# Patient Record
Sex: FEMALE | Race: WHITE | Employment: FULL TIME | URBAN - METROPOLITAN AREA
[De-identification: names, ages, dates, MRNs, and addresses within clinical notes are randomized per-mention and may not be internally consistent; named-entity substitution may affect disease eponyms.]

---

## 2017-05-17 ENCOUNTER — OFFICE VISIT (OUTPATIENT)
Dept: FAMILY MEDICINE CLINIC | Age: 47
End: 2017-05-17

## 2017-05-17 VITALS
RESPIRATION RATE: 16 BRPM | SYSTOLIC BLOOD PRESSURE: 134 MMHG | WEIGHT: 181.4 LBS | BODY MASS INDEX: 32.14 KG/M2 | TEMPERATURE: 96.7 F | HEART RATE: 72 BPM | HEIGHT: 63 IN | DIASTOLIC BLOOD PRESSURE: 88 MMHG

## 2017-05-17 DIAGNOSIS — J01.90 ACUTE SINUSITIS, UNSPECIFIED: ICD-10-CM

## 2017-05-17 DIAGNOSIS — J06.9 ACUTE UPPER RESPIRATORY INFECTION: Primary | ICD-10-CM

## 2017-05-17 DIAGNOSIS — J30.1 SEASONAL ALLERGIC RHINITIS DUE TO POLLEN: ICD-10-CM

## 2017-05-17 DIAGNOSIS — I10 ESSENTIAL HYPERTENSION: ICD-10-CM

## 2017-05-17 PROCEDURE — G8419 CALC BMI OUT NRM PARAM NOF/U: HCPCS | Performed by: FAMILY MEDICINE

## 2017-05-17 PROCEDURE — 99213 OFFICE O/P EST LOW 20 MIN: CPT | Performed by: FAMILY MEDICINE

## 2017-05-17 PROCEDURE — 4004F PT TOBACCO SCREEN RCVD TLK: CPT | Performed by: FAMILY MEDICINE

## 2017-05-17 PROCEDURE — G8427 DOCREV CUR MEDS BY ELIG CLIN: HCPCS | Performed by: FAMILY MEDICINE

## 2017-05-17 RX ORDER — IRBESARTAN 150 MG/1
TABLET ORAL
Qty: 30 TABLET | Refills: 5 | Status: SHIPPED | OUTPATIENT
Start: 2017-05-17 | End: 2018-01-04 | Stop reason: SDUPTHER

## 2017-05-17 RX ORDER — FEXOFENADINE HCL 180 MG/1
180 TABLET ORAL DAILY
Qty: 30 TABLET | Refills: 1 | Status: SHIPPED | OUTPATIENT
Start: 2017-05-17

## 2017-05-17 RX ORDER — BUTALBITAL, ACETAMINOPHEN AND CAFFEINE 50; 325; 40 MG/1; MG/1; MG/1
CAPSULE ORAL
Qty: 40 CAPSULE | Refills: 1 | Status: SHIPPED | OUTPATIENT
Start: 2017-05-17 | End: 2017-10-03 | Stop reason: SDUPTHER

## 2017-05-17 RX ORDER — AZITHROMYCIN 250 MG/1
TABLET, FILM COATED ORAL
Qty: 6 TABLET | Refills: 0 | Status: SHIPPED | OUTPATIENT
Start: 2017-05-17 | End: 2017-07-05 | Stop reason: ALTCHOICE

## 2017-07-05 ENCOUNTER — OFFICE VISIT (OUTPATIENT)
Dept: FAMILY MEDICINE CLINIC | Age: 47
End: 2017-07-05

## 2017-07-05 VITALS
WEIGHT: 183.6 LBS | BODY MASS INDEX: 32.53 KG/M2 | TEMPERATURE: 95.9 F | RESPIRATION RATE: 16 BRPM | SYSTOLIC BLOOD PRESSURE: 160 MMHG | DIASTOLIC BLOOD PRESSURE: 98 MMHG | HEART RATE: 92 BPM | HEIGHT: 63 IN

## 2017-07-05 DIAGNOSIS — M62.830 LUMBAR PARASPINAL MUSCLE SPASM: ICD-10-CM

## 2017-07-05 DIAGNOSIS — S39.012A LUMBAR STRAIN, INITIAL ENCOUNTER: Primary | ICD-10-CM

## 2017-07-05 PROCEDURE — 99213 OFFICE O/P EST LOW 20 MIN: CPT | Performed by: FAMILY MEDICINE

## 2017-07-05 PROCEDURE — 4004F PT TOBACCO SCREEN RCVD TLK: CPT | Performed by: FAMILY MEDICINE

## 2017-07-05 PROCEDURE — G8427 DOCREV CUR MEDS BY ELIG CLIN: HCPCS | Performed by: FAMILY MEDICINE

## 2017-07-05 PROCEDURE — G8419 CALC BMI OUT NRM PARAM NOF/U: HCPCS | Performed by: FAMILY MEDICINE

## 2017-07-05 RX ORDER — TIZANIDINE 4 MG/1
4 TABLET ORAL EVERY 8 HOURS PRN
Qty: 30 TABLET | Refills: 0 | Status: SHIPPED | OUTPATIENT
Start: 2017-07-05 | End: 2017-10-03 | Stop reason: ALTCHOICE

## 2017-07-05 RX ORDER — METHYLPREDNISOLONE 4 MG/1
TABLET ORAL
Qty: 1 KIT | Refills: 0 | Status: SHIPPED | OUTPATIENT
Start: 2017-07-05 | End: 2017-07-11

## 2017-07-05 RX ORDER — OXYCODONE HYDROCHLORIDE AND ACETAMINOPHEN 5; 325 MG/1; MG/1
1 TABLET ORAL EVERY 6 HOURS PRN
Qty: 12 TABLET | Refills: 0 | Status: SHIPPED | OUTPATIENT
Start: 2017-07-05 | End: 2017-07-12

## 2017-07-05 ASSESSMENT — ENCOUNTER SYMPTOMS
COUGH: 0
ABDOMINAL PAIN: 0
EYES NEGATIVE: 1
DIARRHEA: 0
SHORTNESS OF BREATH: 0
CONSTIPATION: 0
NAUSEA: 0

## 2017-10-03 ENCOUNTER — OFFICE VISIT (OUTPATIENT)
Dept: FAMILY MEDICINE CLINIC | Age: 47
End: 2017-10-03

## 2017-10-03 VITALS
BODY MASS INDEX: 32.25 KG/M2 | HEIGHT: 63 IN | HEART RATE: 68 BPM | TEMPERATURE: 96.8 F | RESPIRATION RATE: 16 BRPM | DIASTOLIC BLOOD PRESSURE: 84 MMHG | SYSTOLIC BLOOD PRESSURE: 124 MMHG | WEIGHT: 182 LBS

## 2017-10-03 DIAGNOSIS — J01.90 ACUTE SINUSITIS, UNSPECIFIED: ICD-10-CM

## 2017-10-03 DIAGNOSIS — F17.200 CURRENT SMOKER: ICD-10-CM

## 2017-10-03 DIAGNOSIS — S66.911A WRIST STRAIN, RIGHT, INITIAL ENCOUNTER: ICD-10-CM

## 2017-10-03 DIAGNOSIS — J06.9 ACUTE UPPER RESPIRATORY INFECTION: Primary | ICD-10-CM

## 2017-10-03 DIAGNOSIS — I10 ESSENTIAL HYPERTENSION: ICD-10-CM

## 2017-10-03 PROCEDURE — 4004F PT TOBACCO SCREEN RCVD TLK: CPT | Performed by: FAMILY MEDICINE

## 2017-10-03 PROCEDURE — 99213 OFFICE O/P EST LOW 20 MIN: CPT | Performed by: FAMILY MEDICINE

## 2017-10-03 PROCEDURE — G8484 FLU IMMUNIZE NO ADMIN: HCPCS | Performed by: FAMILY MEDICINE

## 2017-10-03 PROCEDURE — G8419 CALC BMI OUT NRM PARAM NOF/U: HCPCS | Performed by: FAMILY MEDICINE

## 2017-10-03 PROCEDURE — G8427 DOCREV CUR MEDS BY ELIG CLIN: HCPCS | Performed by: FAMILY MEDICINE

## 2017-10-03 RX ORDER — AZITHROMYCIN 250 MG/1
TABLET, FILM COATED ORAL
Qty: 6 TABLET | Refills: 0 | Status: SHIPPED | OUTPATIENT
Start: 2017-10-03 | End: 2018-08-22 | Stop reason: ALTCHOICE

## 2017-10-03 RX ORDER — METHYLPREDNISOLONE 4 MG/1
TABLET ORAL
Qty: 1 KIT | Refills: 0 | Status: SHIPPED | OUTPATIENT
Start: 2017-10-03 | End: 2018-08-22 | Stop reason: ALTCHOICE

## 2017-10-03 RX ORDER — BUTALBITAL, ACETAMINOPHEN AND CAFFEINE 50; 325; 40 MG/1; MG/1; MG/1
CAPSULE ORAL
Qty: 40 CAPSULE | Refills: 3 | Status: SHIPPED | OUTPATIENT
Start: 2017-10-03 | End: 2018-08-22 | Stop reason: SDUPTHER

## 2017-10-03 ASSESSMENT — ENCOUNTER SYMPTOMS
SORE THROAT: 1
COUGH: 1
SHORTNESS OF BREATH: 0
EYES NEGATIVE: 1
SINUS PRESSURE: 1
CONSTIPATION: 0
DIARRHEA: 0
NAUSEA: 0
ABDOMINAL PAIN: 0

## 2017-10-03 NOTE — PROGRESS NOTES
Subjective  Mary Rider, 50 y.o. female presents today with:  Chief Complaint   Patient presents with    URI     Patient presents today complaining of sinus pressure, sore throat, chest congestion, and cough x 3 days. Has not tried anything for it.  Wrist Pain                HPI    Patient presents today Complaining of upper respiratory symptoms including sinus congestion sore throat with minimal dry cough. Symptoms coming on for 3 days. Has not really tried anything for it    Taking current medications which were reviewed. Problem list discussed. Eating okay. Continues to work full-time    Overall doing well. Has 1 new problem / question. Also complaining of right wrist pain x 1 week. Thinks that she either hurt it while golfing, or while lifting up a jug of spring water. There is swelling, and severe pain. Has tried OTC Tylenol and Ice with mild relief. States there is a constant throbbing. Health Maintenance Is Up-To-Date          No other questions and or concerns for today's visit      Review of Systems   Constitutional: Negative for activity change, appetite change, fatigue and fever. HENT: Positive for congestion, sinus pressure and sore throat. Negative for ear pain. Eyes: Negative. Respiratory: Positive for cough. Negative for shortness of breath. Cardiovascular: Negative for chest pain and palpitations. Gastrointestinal: Negative for abdominal pain, constipation, diarrhea and nausea. Genitourinary: Negative for dysuria and frequency. Neurological: Negative for dizziness and headaches.          Past Medical History:   Diagnosis Date    Anxiety     Cuboid fracture     Depression     Headache 10/3/2013     Past Surgical History:   Procedure Laterality Date    CYST REMOVAL      cervix    FOOT SURGERY      right heel spur     Social History     Social History    Marital status:      Spouse name: Armani Jackman     Number of children: 1    Years of education: N/A     Occupational History   [employer]      Social History Main Topics    Smoking status: Current Every Day Smoker     Packs/day: 1.00     Years: 20.00     Types: Cigarettes     Start date: 1/1/1996    Smokeless tobacco: Never Used    Alcohol use No    Drug use: No    Sexual activity: Not on file     Other Topics Concern    Not on file     Social History Narrative     History reviewed. No pertinent family history. Allergies   Allergen Reactions    Motrin [Ibuprofen] Diarrhea    Sulfa Antibiotics     Cefuroxime Axetil Rash    Ciprofloxacin Nausea And Vomiting and Rash     Current Outpatient Prescriptions   Medication Sig Dispense Refill    butalbital-apap-caffeine (ESGIC) -40 MG CAPS 1-2 tablets by mouth every 4 hours prn 40 capsule 3    azithromycin (ZITHROMAX Z-JACE) 250 MG tablet 2 STAT THEN 1 DAILY 6 tablet 0    methylPREDNISolone (MEDROL DOSEPACK) 4 MG tablet Take by mouth. 1 kit 0    irbesartan (AVAPRO) 150 MG tablet take 1 tablet by mouth once daily 30 tablet 5    fexofenadine (ALLEGRA) 180 MG tablet Take 1 tablet by mouth daily 30 tablet 1     No current facility-administered medications for this visit. PMH, Surgical Hx, Family Hx, and Social Hx reviewed and updated. Health Maintenance reviewed. Objective    Vitals:    10/03/17 1135   BP: 124/84   Pulse: 68   Resp: 16   Temp: 96.8 °F (36 °C)   TempSrc: Temporal   Weight: 182 lb (82.6 kg)   Height: 5' 2.5\" (1.588 m)       Physical Exam   Constitutional: She appears well-developed. No distress. HENT:   Head: Normocephalic. Right Ear: Hearing and tympanic membrane normal. No swelling. Left Ear: Hearing and tympanic membrane normal.   Nose: Rhinorrhea present. Right sinus exhibits maxillary sinus tenderness. Left sinus exhibits maxillary sinus tenderness. Mouth/Throat: Posterior oropharyngeal erythema present. Eyes: EOM are normal. Pupils are equal, round, and reactive to light.    Neck: Normal range of motion. Neck supple. No thyromegaly present. Cardiovascular: Normal rate, regular rhythm and normal heart sounds. No murmur heard. Pulmonary/Chest: Effort normal and breath sounds normal. She has no wheezes. Abdominal: Soft. Bowel sounds are normal. There is no tenderness. Musculoskeletal: She exhibits no edema. Mild ulnar side wrist pain with range of motion. No swelling. Lymphadenopathy:     She has no cervical adenopathy. Neurological: She is alert. Skin: No rash noted. Assessment & Plan   1. Acute upper respiratory infection     2. Acute sinusitis, unspecified  butalbital-apap-caffeine (ESGIC) -40 MG CAPS    azithromycin (ZITHROMAX Z-JACE) 250 MG tablet   3. Wrist strain, right, initial encounter  methylPREDNISolone (MEDROL DOSEPACK) 4 MG tablet   4. Essential hypertension Stable     5. Current smoker Declines treatment       No orders of the defined types were placed in this encounter. Orders Placed This Encounter   Medications    butalbital-apap-caffeine (ESGIC) -40 MG CAPS     Si-2 tablets by mouth every 4 hours prn     Dispense:  40 capsule     Refill:  3    azithromycin (ZITHROMAX Z-JACE) 250 MG tablet     Si STAT THEN 1 DAILY     Dispense:  6 tablet     Refill:  0    methylPREDNISolone (MEDROL DOSEPACK) 4 MG tablet     Sig: Take by mouth. Dispense:  1 kit     Refill:  0     Medications Discontinued During This Encounter   Medication Reason    tiZANidine (ZANAFLEX) 4 MG tablet Therapy completed    butalbital-apap-caffeine (ESGIC) -40 MG CAPS Reorder     No Follow-up on file. Long talk. Avoid activities that exacerbate symptoms  Take medications as prescribed. Over-the-counter cold medication when necessary  Follow up as instructed.   Call if any problems    Controlled Substances Monitoring:          Sarita Cruz MD          Please note this report has been partially produced using speech recognition software  And may cause contain errors related to that system including grammar, punctuation and spelling as well as words and phrases that may seem inappropriate. If there are questions or concerns please feel free to contact me to clarify.

## 2018-01-04 DIAGNOSIS — I10 ESSENTIAL HYPERTENSION: ICD-10-CM

## 2018-01-05 RX ORDER — IRBESARTAN 150 MG/1
TABLET ORAL
Qty: 30 TABLET | Refills: 5 | Status: SHIPPED | OUTPATIENT
Start: 2018-01-05 | End: 2018-08-08 | Stop reason: SDUPTHER

## 2018-08-08 DIAGNOSIS — I10 ESSENTIAL HYPERTENSION: ICD-10-CM

## 2018-08-10 RX ORDER — IRBESARTAN 150 MG/1
TABLET ORAL
Qty: 30 TABLET | Refills: 5 | Status: SHIPPED | OUTPATIENT
Start: 2018-08-10 | End: 2019-03-03 | Stop reason: SDUPTHER

## 2018-08-22 ENCOUNTER — OFFICE VISIT (OUTPATIENT)
Dept: FAMILY MEDICINE CLINIC | Age: 48
End: 2018-08-22
Payer: COMMERCIAL

## 2018-08-22 VITALS
SYSTOLIC BLOOD PRESSURE: 134 MMHG | DIASTOLIC BLOOD PRESSURE: 86 MMHG | TEMPERATURE: 97.7 F | BODY MASS INDEX: 32.25 KG/M2 | HEIGHT: 63 IN | WEIGHT: 182 LBS | HEART RATE: 84 BPM | RESPIRATION RATE: 16 BRPM

## 2018-08-22 DIAGNOSIS — S39.012A STRAIN OF LUMBAR REGION, INITIAL ENCOUNTER: ICD-10-CM

## 2018-08-22 DIAGNOSIS — Z00.00 ROUTINE GENERAL MEDICAL EXAMINATION AT A HEALTH CARE FACILITY: Primary | ICD-10-CM

## 2018-08-22 DIAGNOSIS — I10 ESSENTIAL HYPERTENSION: ICD-10-CM

## 2018-08-22 DIAGNOSIS — Z00.00 ROUTINE GENERAL MEDICAL EXAMINATION AT A HEALTH CARE FACILITY: ICD-10-CM

## 2018-08-22 DIAGNOSIS — E78.00 HYPERCHOLESTEREMIA: ICD-10-CM

## 2018-08-22 DIAGNOSIS — J43.9 PULMONARY EMPHYSEMA, UNSPECIFIED EMPHYSEMA TYPE (HCC): ICD-10-CM

## 2018-08-22 LAB
ALBUMIN SERPL-MCNC: 4.5 G/DL (ref 3.9–4.9)
ALP BLD-CCNC: 68 U/L (ref 40–130)
ALT SERPL-CCNC: 42 U/L (ref 0–33)
ANION GAP SERPL CALCULATED.3IONS-SCNC: 17 MEQ/L (ref 7–13)
AST SERPL-CCNC: 26 U/L (ref 0–35)
BASOPHILS ABSOLUTE: 0.1 K/UL (ref 0–0.2)
BASOPHILS RELATIVE PERCENT: 1 %
BILIRUB SERPL-MCNC: <0.2 MG/DL (ref 0–1.2)
BUN BLDV-MCNC: 15 MG/DL (ref 6–20)
CALCIUM SERPL-MCNC: 9.5 MG/DL (ref 8.6–10.2)
CHLORIDE BLD-SCNC: 99 MEQ/L (ref 98–107)
CHOLESTEROL, TOTAL: 341 MG/DL (ref 0–199)
CO2: 25 MEQ/L (ref 22–29)
CREAT SERPL-MCNC: 0.45 MG/DL (ref 0.5–0.9)
EOSINOPHILS ABSOLUTE: 0.1 K/UL (ref 0–0.7)
EOSINOPHILS RELATIVE PERCENT: 1.9 %
GFR AFRICAN AMERICAN: >60
GFR NON-AFRICAN AMERICAN: >60
GLOBULIN: 2.6 G/DL (ref 2.3–3.5)
GLUCOSE BLD-MCNC: 98 MG/DL (ref 74–109)
HCT VFR BLD CALC: 46.9 % (ref 37–47)
HDLC SERPL-MCNC: 40 MG/DL (ref 40–59)
HEMOGLOBIN: 15.9 G/DL (ref 12–16)
LDL CHOLESTEROL CALCULATED: ABNORMAL MG/DL (ref 0–129)
LYMPHOCYTES ABSOLUTE: 2.7 K/UL (ref 1–4.8)
LYMPHOCYTES RELATIVE PERCENT: 34.6 %
MCH RBC QN AUTO: 32.1 PG (ref 27–31.3)
MCHC RBC AUTO-ENTMCNC: 33.8 % (ref 33–37)
MCV RBC AUTO: 95 FL (ref 82–100)
MONOCYTES ABSOLUTE: 0.6 K/UL (ref 0.2–0.8)
MONOCYTES RELATIVE PERCENT: 7.5 %
NEUTROPHILS ABSOLUTE: 4.4 K/UL (ref 1.4–6.5)
NEUTROPHILS RELATIVE PERCENT: 55 %
PDW BLD-RTO: 13.8 % (ref 11.5–14.5)
PLATELET # BLD: 238 K/UL (ref 130–400)
POTASSIUM SERPL-SCNC: 4.8 MEQ/L (ref 3.5–5.1)
RBC # BLD: 4.94 M/UL (ref 4.2–5.4)
SODIUM BLD-SCNC: 141 MEQ/L (ref 132–144)
TOTAL PROTEIN: 7.1 G/DL (ref 6.4–8.1)
TRIGL SERPL-MCNC: 863 MG/DL (ref 0–200)
WBC # BLD: 7.9 K/UL (ref 4.8–10.8)

## 2018-08-22 PROCEDURE — 99396 PREV VISIT EST AGE 40-64: CPT | Performed by: FAMILY MEDICINE

## 2018-08-22 RX ORDER — METHYLPREDNISOLONE 4 MG/1
TABLET ORAL
Qty: 1 KIT | Refills: 0 | Status: SHIPPED | OUTPATIENT
Start: 2018-08-22 | End: 2019-02-08 | Stop reason: ALTCHOICE

## 2018-08-22 RX ORDER — BUTALBITAL, ACETAMINOPHEN AND CAFFEINE 50; 325; 40 MG/1; MG/1; MG/1
CAPSULE ORAL
Qty: 40 CAPSULE | Refills: 5 | Status: SHIPPED | OUTPATIENT
Start: 2018-08-22 | End: 2019-02-08 | Stop reason: SDUPTHER

## 2018-08-22 ASSESSMENT — PATIENT HEALTH QUESTIONNAIRE - PHQ9
SUM OF ALL RESPONSES TO PHQ QUESTIONS 1-9: 0
1. LITTLE INTEREST OR PLEASURE IN DOING THINGS: 0
SUM OF ALL RESPONSES TO PHQ QUESTIONS 1-9: 0
2. FEELING DOWN, DEPRESSED OR HOPELESS: 0
SUM OF ALL RESPONSES TO PHQ9 QUESTIONS 1 & 2: 0

## 2018-08-22 ASSESSMENT — ENCOUNTER SYMPTOMS
CONSTIPATION: 0
ABDOMINAL PAIN: 0
DIARRHEA: 0
COUGH: 0
NAUSEA: 0
SHORTNESS OF BREATH: 0
EYES NEGATIVE: 1

## 2018-08-22 NOTE — PATIENT INSTRUCTIONS
trying to quit smoking. · Consider signing up for a smoking cessation program, such as the American Lung Association's Freedom from Smoking program.  · Get text messaging support. Go to the website at www.smokefree. gov to sign up for the [facility] program.  · Set a quit date. Pick your date carefully so that it is not right in the middle of a big deadline or stressful time. Once you quit, do not even take a puff. Get rid of all ashtrays and lighters after your last cigarette. Clean your house and your clothes so that they do not smell of smoke. · Learn how to be a nonsmoker. Think about ways you can avoid those things that make you reach for a cigarette. ¨ Avoid situations that put you at greatest risk for smoking. For some people, it is hard to have a drink with friends without smoking. For others, they might skip a coffee break with coworkers who smoke. ¨ Change your daily routine. Take a different route to work or eat a meal in a different place. · Cut down on stress. Calm yourself or release tension by doing an activity you enjoy, such as reading a book, taking a hot bath, or gardening. · Talk to your doctor or pharmacist about nicotine replacement therapy, which replaces the nicotine in your body. You still get nicotine but you do not use tobacco. Nicotine replacement products help you slowly reduce the amount of nicotine you need. These products come in several forms, many of them available over-the-counter:  ¨ Nicotine patches  ¨ Nicotine gum and lozenges  ¨ Nicotine inhaler  · Ask your doctor about bupropion (Wellbutrin) or varenicline (Chantix), which are prescription medicines. They do not contain nicotine. They help you by reducing withdrawal symptoms, such as stress and anxiety. · Some people find hypnosis, acupuncture, and massage helpful for ending the smoking habit. · Eat a healthy diet and get regular exercise.  Having healthy habits will help your body move past its craving for

## 2018-08-22 NOTE — PROGRESS NOTES
Subjective  Jonathan Mathews, 46 y.o. female presents today with:  Chief Complaint   Patient presents with    Back Pain     Patient presents today complaining of back pain x 8 days. Denies injury, or swelling. Has tried OTC Ibuprofen, but this is messing with her stomach.  Menopause     Has started going through Menopause. Is getting a menstrual period once every 3 months. HPI    Patient presents today for wellness visit  Taking current medications which were reviewed. Problem list discussed. Eating okay. Stays active. Works full-time. Overall doing well. Has 2 new problem / question. Hurt her back twisting. Similar to what she had in the past.  Localizes to the left lower back area. Also complaining of irregular periods. She is going through menopause. Knows he does get hot flashes. Sees gynecology    Health Maintenance Is Up-To-Date         No other questions and or concerns for today's visit      Review of Systems   Constitutional: Negative for activity change, appetite change, fatigue and fever. HENT: Negative for ear pain. Eyes: Negative. Respiratory: Negative for cough and shortness of breath. Cardiovascular: Negative for chest pain and palpitations. Gastrointestinal: Negative for abdominal pain, constipation, diarrhea and nausea. Genitourinary: Negative for dysuria and frequency. Neurological: Negative for dizziness and headaches.          Past Medical History:   Diagnosis Date    Anxiety     Cuboid fracture     Depression     Headache(784.0) 10/3/2013     Past Surgical History:   Procedure Laterality Date    CYST REMOVAL      cervix    FOOT SURGERY      right heel spur     Social History     Social History    Marital status:      Spouse name: Abhijeet Benitez     Number of children: 1    Years of education: N/A     Occupational History   [employer]      Social History Main Topics    Smoking status: Current Every Day Smoker

## 2018-09-04 ENCOUNTER — TELEPHONE (OUTPATIENT)
Dept: FAMILY MEDICINE CLINIC | Age: 48
End: 2018-09-04

## 2018-09-04 DIAGNOSIS — E78.5 HYPERLIPIDEMIA, UNSPECIFIED HYPERLIPIDEMIA TYPE: Primary | ICD-10-CM

## 2018-09-04 RX ORDER — ATORVASTATIN CALCIUM 10 MG/1
10 TABLET, FILM COATED ORAL DAILY
Qty: 30 TABLET | Refills: 3 | Status: SHIPPED | OUTPATIENT
Start: 2018-09-04 | End: 2019-01-07 | Stop reason: SDUPTHER

## 2018-09-04 NOTE — TELEPHONE ENCOUNTER
Pt aware of following message:    Labs WNL or stable except her cholesterol is very high.  Much higher than last year. Would recommend low-dose statin.  If she agrees please let me know    Notify pt. Pt aware.  Would like this sent to [facility]  Please create med with dosage and directions  Thanks

## 2018-10-12 DIAGNOSIS — E78.5 HYPERLIPIDEMIA, UNSPECIFIED HYPERLIPIDEMIA TYPE: ICD-10-CM

## 2018-10-12 LAB
ALT SERPL-CCNC: 44 U/L (ref 0–33)
CHOLESTEROL, TOTAL: 286 MG/DL (ref 0–199)
HDLC SERPL-MCNC: 34 MG/DL (ref 40–59)
LDL CHOLESTEROL CALCULATED: ABNORMAL MG/DL (ref 0–129)
TRIGL SERPL-MCNC: 1289 MG/DL (ref 0–200)

## 2019-01-07 RX ORDER — ATORVASTATIN CALCIUM 10 MG/1
TABLET, FILM COATED ORAL
Qty: 30 TABLET | Refills: 3 | Status: SHIPPED | OUTPATIENT
Start: 2019-01-07

## 2019-02-08 ENCOUNTER — OFFICE VISIT (OUTPATIENT)
Dept: FAMILY MEDICINE CLINIC | Age: 49
End: 2019-02-08
Payer: COMMERCIAL

## 2019-02-08 VITALS
TEMPERATURE: 96.7 F | HEART RATE: 84 BPM | SYSTOLIC BLOOD PRESSURE: 154 MMHG | WEIGHT: 184.6 LBS | HEIGHT: 63 IN | DIASTOLIC BLOOD PRESSURE: 106 MMHG | BODY MASS INDEX: 32.71 KG/M2 | RESPIRATION RATE: 16 BRPM

## 2019-02-08 DIAGNOSIS — J02.9 SORE THROAT: ICD-10-CM

## 2019-02-08 DIAGNOSIS — I10 ESSENTIAL HYPERTENSION: Primary | ICD-10-CM

## 2019-02-08 DIAGNOSIS — J43.9 PULMONARY EMPHYSEMA, UNSPECIFIED EMPHYSEMA TYPE (HCC): ICD-10-CM

## 2019-02-08 PROCEDURE — G8427 DOCREV CUR MEDS BY ELIG CLIN: HCPCS | Performed by: FAMILY MEDICINE

## 2019-02-08 PROCEDURE — G8484 FLU IMMUNIZE NO ADMIN: HCPCS | Performed by: FAMILY MEDICINE

## 2019-02-08 PROCEDURE — G8417 CALC BMI ABV UP PARAM F/U: HCPCS | Performed by: FAMILY MEDICINE

## 2019-02-08 PROCEDURE — G8926 SPIRO NO PERF OR DOC: HCPCS | Performed by: FAMILY MEDICINE

## 2019-02-08 PROCEDURE — 3023F SPIROM DOC REV: CPT | Performed by: FAMILY MEDICINE

## 2019-02-08 PROCEDURE — 4004F PT TOBACCO SCREEN RCVD TLK: CPT | Performed by: FAMILY MEDICINE

## 2019-02-08 PROCEDURE — 3017F COLORECTAL CA SCREEN DOC REV: CPT | Performed by: FAMILY MEDICINE

## 2019-02-08 PROCEDURE — 99213 OFFICE O/P EST LOW 20 MIN: CPT | Performed by: FAMILY MEDICINE

## 2019-02-08 RX ORDER — BUTALBITAL, ACETAMINOPHEN AND CAFFEINE 50; 325; 40 MG/1; MG/1; MG/1
CAPSULE ORAL
Qty: 40 CAPSULE | Refills: 2 | Status: SHIPPED | OUTPATIENT
Start: 2019-02-08

## 2019-02-08 RX ORDER — HYDROCHLOROTHIAZIDE 12.5 MG/1
12.5 CAPSULE, GELATIN COATED ORAL EVERY MORNING
Qty: 30 CAPSULE | Refills: 5 | Status: SHIPPED | OUTPATIENT
Start: 2019-02-08

## 2019-02-08 ASSESSMENT — ENCOUNTER SYMPTOMS
ABDOMINAL PAIN: 0
NAUSEA: 0
SHORTNESS OF BREATH: 0
SORE THROAT: 1
DIARRHEA: 0
CONSTIPATION: 0
COUGH: 0
EYES NEGATIVE: 1

## 2019-02-13 ENCOUNTER — NURSE ONLY (OUTPATIENT)
Dept: FAMILY MEDICINE CLINIC | Age: 49
End: 2019-02-13

## 2019-02-13 VITALS — SYSTOLIC BLOOD PRESSURE: 140 MMHG | DIASTOLIC BLOOD PRESSURE: 82 MMHG

## 2019-02-13 DIAGNOSIS — I10 ESSENTIAL HYPERTENSION: Primary | ICD-10-CM

## 2019-02-27 ENCOUNTER — TELEPHONE (OUTPATIENT)
Dept: ADMINISTRATIVE | Age: 49
End: 2019-02-27

## 2019-03-03 DIAGNOSIS — I10 ESSENTIAL HYPERTENSION: ICD-10-CM

## 2019-03-04 RX ORDER — IRBESARTAN 150 MG/1
TABLET ORAL
Qty: 30 TABLET | Refills: 5 | Status: SHIPPED | OUTPATIENT
Start: 2019-03-04

## 2019-03-25 ENCOUNTER — TELEPHONE (OUTPATIENT)
Dept: FAMILY MEDICINE CLINIC | Age: 49
End: 2019-03-25

## 2019-03-25 DIAGNOSIS — J06.9 VIRAL URI: ICD-10-CM

## 2019-03-25 DIAGNOSIS — J01.90 ACUTE SINUSITIS, UNSPECIFIED: ICD-10-CM

## 2019-03-25 DIAGNOSIS — R05.9 COUGH: Primary | ICD-10-CM

## 2019-03-25 RX ORDER — ALBUTEROL SULFATE 2.5 MG/3ML
2.5 SOLUTION RESPIRATORY (INHALATION) 4 TIMES DAILY
Qty: 100 EACH | Refills: 2 | Status: SHIPPED | OUTPATIENT
Start: 2019-03-25

## 2019-03-25 RX ORDER — AZITHROMYCIN 250 MG/1
TABLET, FILM COATED ORAL
Qty: 6 TABLET | Refills: 0 | Status: SHIPPED | OUTPATIENT
Start: 2019-03-25

## 2023-03-19 PROBLEM — G43.909 MIGRAINES: Status: ACTIVE | Noted: 2023-03-19

## 2023-03-19 PROBLEM — R42 DIZZINESS: Status: ACTIVE | Noted: 2023-03-19

## 2023-03-19 PROBLEM — M54.31 SCIATICA OF RIGHT SIDE: Status: ACTIVE | Noted: 2023-03-19

## 2023-03-19 PROBLEM — E66.01 CLASS 2 SEVERE OBESITY DUE TO EXCESS CALORIES WITH SERIOUS COMORBIDITY AND BODY MASS INDEX (BMI) OF 37.0 TO 37.9 IN ADULT (MULTI): Status: ACTIVE | Noted: 2023-03-19

## 2023-03-19 PROBLEM — K60.2 ANAL FISSURE: Status: ACTIVE | Noted: 2023-03-19

## 2023-03-19 PROBLEM — R15.1 FECAL SOILING: Status: ACTIVE | Noted: 2023-03-19

## 2023-03-19 PROBLEM — K62.89 RECTAL PAIN: Status: ACTIVE | Noted: 2023-03-19

## 2023-03-19 PROBLEM — B37.89 CANDIDIASIS OF BREAST: Status: ACTIVE | Noted: 2023-03-19

## 2023-03-19 PROBLEM — M54.16 LEFT LUMBAR RADICULOPATHY: Status: ACTIVE | Noted: 2023-03-19

## 2023-03-19 PROBLEM — E78.5 HYPERLIPIDEMIA: Status: ACTIVE | Noted: 2023-03-19

## 2023-03-19 PROBLEM — M54.9 BACK PAIN: Status: ACTIVE | Noted: 2023-03-19

## 2023-03-19 PROBLEM — H65.93 FLUID LEVEL BEHIND TYMPANIC MEMBRANE OF BOTH EARS: Status: ACTIVE | Noted: 2023-03-19

## 2023-03-19 PROBLEM — M51.26 LUMBAR HERNIATED DISC: Status: ACTIVE | Noted: 2023-03-19

## 2023-03-19 PROBLEM — I10 HYPERTENSION: Status: ACTIVE | Noted: 2023-03-19

## 2023-03-19 PROBLEM — H60.92 OTITIS EXTERNA, LEFT: Status: ACTIVE | Noted: 2023-03-19

## 2023-03-19 PROBLEM — H10.9 BACTERIAL CONJUNCTIVITIS OF RIGHT EYE: Status: ACTIVE | Noted: 2023-03-19

## 2023-03-19 PROBLEM — E66.812 CLASS 2 SEVERE OBESITY DUE TO EXCESS CALORIES WITH SERIOUS COMORBIDITY AND BODY MASS INDEX (BMI) OF 37.0 TO 37.9 IN ADULT: Status: ACTIVE | Noted: 2023-03-19

## 2023-03-19 PROBLEM — K60.2 RECTAL FISSURE: Status: ACTIVE | Noted: 2023-03-19

## 2023-03-19 PROBLEM — J30.2 SEASONAL ALLERGIES: Status: ACTIVE | Noted: 2023-03-19

## 2023-03-19 PROBLEM — H92.02 LEFT EAR PAIN: Status: ACTIVE | Noted: 2023-03-19

## 2023-03-19 PROBLEM — R29.898 WEAKNESS OF LEFT LOWER EXTREMITY: Status: ACTIVE | Noted: 2023-03-19

## 2023-03-19 PROBLEM — T14.8XXA MUSCLE STRAIN: Status: ACTIVE | Noted: 2023-03-19

## 2023-03-19 PROBLEM — M47.26 OSTEOARTHRITIS OF SPINE WITH RADICULOPATHY, LUMBAR REGION: Status: ACTIVE | Noted: 2023-03-19

## 2023-03-19 RX ORDER — ICOSAPENT ETHYL 1 G/1
2 CAPSULE ORAL 2 TIMES DAILY
COMMUNITY
Start: 2021-07-27 | End: 2024-05-08 | Stop reason: WASHOUT

## 2023-03-19 RX ORDER — FAMOTIDINE 40 MG/1
1 TABLET, FILM COATED ORAL 2 TIMES DAILY
COMMUNITY
Start: 2022-01-17 | End: 2024-05-08 | Stop reason: ALTCHOICE

## 2023-03-19 RX ORDER — BUTALBITAL, ACETAMINOPHEN AND CAFFEINE 50; 325; 40 MG/1; MG/1; MG/1
1-2 CAPSULE ORAL EVERY 4 HOURS PRN
COMMUNITY
Start: 2016-04-18 | End: 2023-03-27 | Stop reason: SDUPTHER

## 2023-03-19 RX ORDER — POLYMYXIN B SULFATE AND TRIMETHOPRIM 1; 10000 MG/ML; [USP'U]/ML
1 SOLUTION OPHTHALMIC
COMMUNITY
End: 2024-02-20 | Stop reason: ALTCHOICE

## 2023-03-19 RX ORDER — ALBUTEROL SULFATE 90 UG/1
2 AEROSOL, METERED RESPIRATORY (INHALATION) EVERY 4 HOURS PRN
COMMUNITY
Start: 2016-02-03 | End: 2023-03-27 | Stop reason: SDUPTHER

## 2023-03-19 RX ORDER — ROSUVASTATIN CALCIUM 10 MG/1
1 TABLET, COATED ORAL DAILY
COMMUNITY
Start: 2021-07-27 | End: 2023-10-04 | Stop reason: SDUPTHER

## 2023-03-19 RX ORDER — ALBUTEROL SULFATE 0.83 MG/ML
SOLUTION RESPIRATORY (INHALATION) EVERY 6 HOURS PRN
COMMUNITY
Start: 2016-04-18

## 2023-03-19 RX ORDER — IRBESARTAN AND HYDROCHLOROTHIAZIDE 300; 12.5 MG/1; MG/1
1 TABLET, FILM COATED ORAL DAILY
COMMUNITY
Start: 2021-05-04 | End: 2023-04-24

## 2023-03-19 RX ORDER — ATORVASTATIN CALCIUM 10 MG/1
1 TABLET, FILM COATED ORAL DAILY
COMMUNITY
Start: 2016-02-04 | End: 2023-11-14 | Stop reason: ALTCHOICE

## 2023-03-27 ENCOUNTER — OFFICE VISIT (OUTPATIENT)
Dept: PRIMARY CARE | Facility: CLINIC | Age: 53
End: 2023-03-27
Payer: COMMERCIAL

## 2023-03-27 VITALS
OXYGEN SATURATION: 95 % | TEMPERATURE: 98.1 F | DIASTOLIC BLOOD PRESSURE: 76 MMHG | BODY MASS INDEX: 36.14 KG/M2 | WEIGHT: 204 LBS | HEART RATE: 96 BPM | SYSTOLIC BLOOD PRESSURE: 120 MMHG | HEIGHT: 63 IN

## 2023-03-27 DIAGNOSIS — H65.93 FLUID LEVEL BEHIND TYMPANIC MEMBRANE OF BOTH EARS: ICD-10-CM

## 2023-03-27 DIAGNOSIS — J01.90 ACUTE NON-RECURRENT SINUSITIS, UNSPECIFIED LOCATION: ICD-10-CM

## 2023-03-27 DIAGNOSIS — Z12.31 ENCOUNTER FOR SCREENING MAMMOGRAM FOR MALIGNANT NEOPLASM OF BREAST: ICD-10-CM

## 2023-03-27 DIAGNOSIS — E78.5 HYPERLIPIDEMIA, UNSPECIFIED HYPERLIPIDEMIA TYPE: ICD-10-CM

## 2023-03-27 DIAGNOSIS — G43.809 OTHER MIGRAINE WITHOUT STATUS MIGRAINOSUS, NOT INTRACTABLE: ICD-10-CM

## 2023-03-27 DIAGNOSIS — H92.02 LEFT EAR PAIN: Primary | ICD-10-CM

## 2023-03-27 DIAGNOSIS — I10 HYPERTENSION, UNSPECIFIED TYPE: ICD-10-CM

## 2023-03-27 DIAGNOSIS — B07.9 VIRAL WARTS, UNSPECIFIED TYPE: ICD-10-CM

## 2023-03-27 DIAGNOSIS — R06.2 WHEEZING: ICD-10-CM

## 2023-03-27 PROCEDURE — 17110 DESTRUCTION B9 LES UP TO 14: CPT | Performed by: FAMILY MEDICINE

## 2023-03-27 PROCEDURE — 3078F DIAST BP <80 MM HG: CPT | Performed by: FAMILY MEDICINE

## 2023-03-27 PROCEDURE — 99213 OFFICE O/P EST LOW 20 MIN: CPT | Performed by: FAMILY MEDICINE

## 2023-03-27 PROCEDURE — 3074F SYST BP LT 130 MM HG: CPT | Performed by: FAMILY MEDICINE

## 2023-03-27 RX ORDER — ALBUTEROL SULFATE 90 UG/1
2 AEROSOL, METERED RESPIRATORY (INHALATION) EVERY 4 HOURS PRN
Qty: 18 G | Refills: 2 | Status: SHIPPED | OUTPATIENT
Start: 2023-03-27 | End: 2023-10-04 | Stop reason: SDUPTHER

## 2023-03-27 RX ORDER — AZITHROMYCIN 250 MG/1
TABLET, FILM COATED ORAL
Qty: 6 TABLET | Refills: 0 | Status: SHIPPED | OUTPATIENT
Start: 2023-03-27 | End: 2023-04-01

## 2023-03-27 RX ORDER — BUTALBITAL, ACETAMINOPHEN AND CAFFEINE 50; 325; 40 MG/1; MG/1; MG/1
1-2 CAPSULE ORAL EVERY 4 HOURS PRN
Qty: 30 CAPSULE | Refills: 1 | Status: SHIPPED | OUTPATIENT
Start: 2023-03-27 | End: 2023-10-04 | Stop reason: SDUPTHER

## 2023-03-27 ASSESSMENT — PATIENT HEALTH QUESTIONNAIRE - PHQ9
SUM OF ALL RESPONSES TO PHQ9 QUESTIONS 1 AND 2: 0
1. LITTLE INTEREST OR PLEASURE IN DOING THINGS: NOT AT ALL
2. FEELING DOWN, DEPRESSED OR HOPELESS: NOT AT ALL

## 2023-03-27 NOTE — PROGRESS NOTES
"Subjective   Patient ID: Giulia Vaz is a 53 y.o. female who presents for Migraine, Hypertension, Earache, and Skin Tag.    HPI  Migraine  Butalbital-acetaminophen-caffeine   Working well  Last migraine x 3 days ago   Lasted 1 1/2 dayys  Took Tylenol at the time  Located in the front of her head  Described as an aching shooting and stabbing   Does have nausea and vomiting  Has increased stress, also having anxiety     HTN follow up   Denies chest pain,SOB  Denies any swelling, headaches, lightheadedness or dizziness  Eats a generally unhealthy diet  Exercises infrequently   Does check BP at home  Currently taking Irbesartan-hydrochlorothiazide    Ear pain   Left x 5 weeks   Patient was seen in an  and prescribed ear drops  With mild relief  Would like her ears checked today    Skin tag  Left cheek x 6 weeks   Has increased in size   Has changed to black  Admits to itching   Has a h/o multiple skin tags     Review of Systems  Constitutional- No activity change. No appetite change.  Eyes- Denies vision changes.   ENT: Positive L ear pain   Respiratory- No shortness of breath.  Cardiovascular- No palpitations. No chest pain.  GI- Positive N/V. No diarrhea or constipation. Denies abdominal pain.  Musculoskeletal- Denies joint swelling.  Extremities- No edema.  Neurological- Denies headaches. Denies dizziness.  Skin- No rashes. Positive skin tag L cheek   Psychiatric/Behavioral- Denies significant depressed mood. Positive stress, anxiety     Objective     /76   Pulse 96   Temp 36.7 °C (98.1 °F) (Temporal)   Ht 1.588 m (5' 2.5\")   Wt 92.5 kg (204 lb)   SpO2 95%   BMI 36.72 kg/m²     Allergies   Allergen Reactions    Ampicillin Hives    Amoxicillin-Pot Clavulanate Rash    Cefuroxime Axetil Rash    Ciprofloxacin Rash    Sulfur Rash     Constitutional- Well-nourished. No distress  Head- unremarkable.  Ears- TMs clear.  Mild fluid and bulging behind tympanic membrane  Eyes- PERRL. Conjunctiva normal.  Nose- " Normal. No rhinorrhea noted.  Throat- Oropharynx is clear and moist.  Neck- Supple with no thyromegaly.  No significant cervical adenopathy noted.  Pulmonary/Chest- Breath sounds normal with normal effort.  No wheezing.  Heart- Regular rate and rhythm.  No murmur.  Abdomen- Soft and non-tender.  No masses noted.  Musculoskeletal- Normal ROM.  No significant joint swelling  Extremities- No edema.   Neurological- Alert.  No noted deficits.  Skin- Warm.  No rashes.  Small warty lesion left chin treated with liquid nitrogen in the freeze thaw freeze method.  Tolerated well  Psychiatric/Behavioral- Mood and affect normal.  Behavior normal.     Patient ID: Giulia Vaz is a 53 y.o. female.    Skin Tag Removal    Date/Time: 3/27/2023 1:37 PM    Performed by: Andrae Avila MD  Authorized by: Andrae Avila MD    Consent:     Consent obtained:  Verbal    Consent given by:  Patient    Risks, benefits, and alternatives were discussed: yes      Risks discussed:  Bleeding, pain and infection  Universal protocol:     Patient identity confirmed:  Verbally with patient  Sedation:     Sedation type:  None  Anesthesia:     Anesthesia method:  Topical application    Topical anesthetic:  Lidocaine gel  Post-procedure details:     Procedure completion:  Tolerated well, no immediate complications    Assessment/Plan   Long talk. Treatment options reviewed.    Continue and take your medications as prescribed.    Health Maintenance issues discussed.    Importance of healthy diet and regular exercise regimen discussed.    We will contact you with any test results ordered. If you do not hear from us, please contact.    Follow-up as instructed or sooner if any problems or symptoms do not resolve as expected.    Problem List Items Addressed This Visit          Circulatory    Hypertension    Relevant Orders    CBC and Auto Differential    Comprehensive Metabolic Panel       Other    Fluid level behind tympanic membrane of both ears     Hyperlipidemia    Relevant Orders    Lipid Panel    Migraines    Relevant Medications    butalbital-acetaminophen-caff (Esgic) -40 mg capsule    Left ear pain - Primary     Other Visit Diagnoses       Wheezing        Relevant Medications    albuterol 90 mcg/actuation inhaler    Encounter for screening mammogram for malignant neoplasm of breast        Relevant Orders    BI mammo bilateral screening tomosynthesis    Acute non-recurrent sinusitis, unspecified location        Relevant Medications    azithromycin (Zithromax) 250 mg tablet          Migraines: Butblbital-acetaminophen-caffeine     HTN: Stable, well controlled on Irbesartan-hydrochlorothiazide, continue.      Skin wart lesion: Removed with liquid nitrogen IO today, tolerated well.      L ear pain/sinusitis: Start Z-pack, sent to pharmacy.      Health maintenance: Fasting annual blood work ordered to be done at any  lab.     Breast cancer screening: Mammogram ordered to be done at any  lab at your earliest convenience.     Anxiety/stress:     Scribe Attestation  By signing my name below, I, Marco A Billy, Scribe   attest that this documentation has been prepared under the direction and in the presence of Andrae Avila MD.

## 2023-03-27 NOTE — LETTER
April 6, 2023     Cris Asher  [address] [address] [facility] [address]      Dear Ms. Asher:    Below are the results from your recent visit:    Resulted Orders   BI mammo bilateral screening tomosynthesis    Narrative    Interpreted By:  JEAN-PIERRE BANSAL MD  MRN: [id number]  Patient Name: CRIS ASHER     STUDY:  DIGITAL MAMM SCREENING W/ DELMER;  4/4/2023 11:23 am     ACCESSION NUMBER(S):  [id number]     ORDERING CLINICIAN:  ANDRAE LUCERO     INDICATION:  Screening.     COMPARISON:  New baseline.     FINDINGS:  2D and tomosynthesis images were reviewed at 1 mm slice thickness.     There are areas of scattered fibroglandular tissue.   No suspicious  masses or calcifications are identified.       Impression    No mammographic evidence of malignancy.     BI-RADS CATEGORY:     Category: 1 - Negative.  Recommendation: 1 Year Screening.     For any future breast imaging appointments, please call [phone]  ([phone]).     Patient letter sent SNORM           The test results show that your Mammogram is within normal limits.  Repeat in 1 year   If you have any questions or concerns, please don't hesitate to call.         Sincerely,        Andrae Lucero MD

## 2023-04-24 DIAGNOSIS — I10 HYPERTENSION, UNSPECIFIED TYPE: ICD-10-CM

## 2023-04-24 RX ORDER — IRBESARTAN AND HYDROCHLOROTHIAZIDE 300; 12.5 MG/1; MG/1
1 TABLET, FILM COATED ORAL DAILY
Qty: 90 TABLET | Refills: 0 | Status: SHIPPED | OUTPATIENT
Start: 2023-04-24 | End: 2023-07-21

## 2023-07-21 DIAGNOSIS — I10 HYPERTENSION, UNSPECIFIED TYPE: ICD-10-CM

## 2023-07-21 RX ORDER — IRBESARTAN AND HYDROCHLOROTHIAZIDE 300; 12.5 MG/1; MG/1
1 TABLET, FILM COATED ORAL DAILY
Qty: 90 TABLET | Refills: 0 | Status: SHIPPED | OUTPATIENT
Start: 2023-07-21 | End: 2023-10-10

## 2023-10-02 ENCOUNTER — LAB (OUTPATIENT)
Dept: LAB | Facility: LAB | Age: 53
End: 2023-10-02
Payer: COMMERCIAL

## 2023-10-02 ENCOUNTER — TELEPHONE (OUTPATIENT)
Dept: PRIMARY CARE | Facility: CLINIC | Age: 53
End: 2023-10-02

## 2023-10-02 DIAGNOSIS — E78.5 HYPERLIPIDEMIA, UNSPECIFIED HYPERLIPIDEMIA TYPE: ICD-10-CM

## 2023-10-02 DIAGNOSIS — G43.809 OTHER MIGRAINE WITHOUT STATUS MIGRAINOSUS, NOT INTRACTABLE: ICD-10-CM

## 2023-10-02 DIAGNOSIS — I10 HYPERTENSION, UNSPECIFIED TYPE: ICD-10-CM

## 2023-10-02 DIAGNOSIS — R73.9 ELEVATED BLOOD SUGAR: Primary | ICD-10-CM

## 2023-10-02 DIAGNOSIS — R06.2 WHEEZING: ICD-10-CM

## 2023-10-02 LAB
ALBUMIN SERPL BCP-MCNC: 4.4 G/DL (ref 3.4–5)
ALP SERPL-CCNC: 71 U/L (ref 33–110)
ALT SERPL W P-5'-P-CCNC: 35 U/L (ref 7–45)
ANION GAP SERPL CALC-SCNC: 15 MMOL/L (ref 10–20)
AST SERPL W P-5'-P-CCNC: 22 U/L (ref 9–39)
BASOPHILS # BLD AUTO: 0.06 X10*3/UL (ref 0–0.1)
BASOPHILS NFR BLD AUTO: 0.7 %
BILIRUB SERPL-MCNC: 0.5 MG/DL (ref 0–1.2)
BUN SERPL-MCNC: 12 MG/DL (ref 6–23)
CALCIUM SERPL-MCNC: 9.3 MG/DL (ref 8.6–10.3)
CHLORIDE SERPL-SCNC: 98 MMOL/L (ref 98–107)
CHOLEST SERPL-MCNC: 287 MG/DL (ref 0–199)
CHOLESTEROL/HDL RATIO: 7.2
CO2 SERPL-SCNC: 27 MMOL/L (ref 21–32)
CREAT SERPL-MCNC: 0.58 MG/DL (ref 0.5–1.05)
EOSINOPHIL # BLD AUTO: 0.13 X10*3/UL (ref 0–0.7)
EOSINOPHIL NFR BLD AUTO: 1.6 %
ERYTHROCYTE [DISTWIDTH] IN BLOOD BY AUTOMATED COUNT: 13.4 % (ref 11.5–14.5)
GFR SERPL CREATININE-BSD FRML MDRD: >90 ML/MIN/1.73M*2
GLUCOSE SERPL-MCNC: 155 MG/DL (ref 74–99)
HCT VFR BLD AUTO: 49.4 % (ref 36–46)
HDLC SERPL-MCNC: 39.7 MG/DL
HGB BLD-MCNC: 15.9 G/DL (ref 12–16)
IMM GRANULOCYTES # BLD AUTO: 0.02 X10*3/UL (ref 0–0.7)
IMM GRANULOCYTES NFR BLD AUTO: 0.2 % (ref 0–0.9)
LDLC SERPL CALC-MCNC: ABNORMAL MG/DL
LYMPHOCYTES # BLD AUTO: 2.05 X10*3/UL (ref 1.2–4.8)
LYMPHOCYTES NFR BLD AUTO: 25.5 %
MCH RBC QN AUTO: 30.8 PG (ref 26–34)
MCHC RBC AUTO-ENTMCNC: 32.2 G/DL (ref 32–36)
MCV RBC AUTO: 96 FL (ref 80–100)
MONOCYTES # BLD AUTO: 0.65 X10*3/UL (ref 0.1–1)
MONOCYTES NFR BLD AUTO: 8.1 %
NEUTROPHILS # BLD AUTO: 5.14 X10*3/UL (ref 1.2–7.7)
NEUTROPHILS NFR BLD AUTO: 63.9 %
NON HDL CHOLESTEROL: 247 MG/DL (ref 0–149)
NRBC BLD-RTO: 0 /100 WBCS (ref 0–0)
PLATELET # BLD AUTO: 264 X10*3/UL (ref 150–450)
PMV BLD AUTO: 9.9 FL (ref 7.5–11.5)
POTASSIUM SERPL-SCNC: 4.3 MMOL/L (ref 3.5–5.3)
PROT SERPL-MCNC: 7 G/DL (ref 6.4–8.2)
RBC # BLD AUTO: 5.16 X10*6/UL (ref 4–5.2)
SODIUM SERPL-SCNC: 136 MMOL/L (ref 136–145)
TRIGL SERPL-MCNC: 587 MG/DL (ref 0–149)
VLDL: ABNORMAL
WBC # BLD AUTO: 8.1 X10*3/UL (ref 4.4–11.3)

## 2023-10-02 PROCEDURE — 36415 COLL VENOUS BLD VENIPUNCTURE: CPT

## 2023-10-02 NOTE — TELEPHONE ENCOUNTER
Patient is calling because she hasn't had her cholesterol med, Rosuvastatin 10 mg in 2 weeks because she had to get her blood work done. She got it done today(aware all the blood work wasn't back yet). She wanted to know if you could review the labs and then send in a rx  for her Rosuvastain 10 mg to [facility] in [place]  Aware you are out of the office today    Rx Refill Request Telephone Encounter    Name:  Giulia Vaz  : 1970     Medication Name:  Rosuvastatin  Dose (Optional):    10mg  Quantity (Optional):      Directions (Optional):   Take 1 tablet (10mg) by mouth once daily    ALLERGIES:   see list     Specific Pharmacy location:  [facility]    Date of last appointment:  3/27/23  Date of next appointment:  none    Best number to reach patient:  [phone]

## 2023-10-02 NOTE — LETTER
October 10, 2023     Giulia Vaz  [address] [address] [address]      Dear Ms. Vaz:    Below are the results from your recent visit:    Resulted Orders   CBC and Auto Differential   Result Value Ref Range    WBC 8.1 4.4 - 11.3 x10*3/uL    nRBC 0.0 0.0 - 0.0 /100 WBCs    RBC 5.16 4.00 - 5.20 x10*6/uL    Hemoglobin 15.9 12.0 - 16.0 g/dL    Hematocrit 49.4 (H) 36.0 - 46.0 %    MCV 96 80 - 100 fL    MCH 30.8 26.0 - 34.0 pg    MCHC 32.2 32.0 - 36.0 g/dL    RDW 13.4 11.5 - 14.5 %    Platelets 264 150 - 450 x10*3/uL    MPV 9.9 7.5 - 11.5 fL    Neutrophils % 63.9 40.0 - 80.0 %    Immature Granulocytes %, Automated 0.2 0.0 - 0.9 %      Comment:      Immature Granulocyte Count (IG) includes promyelocytes, myelocytes and metamyelocytes but does not include bands. Percent differential counts (%) should be interpreted in the context of the absolute cell counts (cells/UL).    Lymphocytes % 25.5 13.0 - 44.0 %    Monocytes % 8.1 2.0 - 10.0 %    Eosinophils % 1.6 0.0 - 6.0 %    Basophils % 0.7 0.0 - 2.0 %    Neutrophils Absolute 5.14 1.20 - 7.70 x10*3/uL      Comment:      Percent differential counts (%) should be interpreted in the context of the absolute cell counts (cells/uL).    Immature Granulocytes Absolute, Automated 0.02 0.00 - 0.70 x10*3/uL    Lymphocytes Absolute 2.05 1.20 - 4.80 x10*3/uL    Monocytes Absolute 0.65 0.10 - 1.00 x10*3/uL    Eosinophils Absolute 0.13 0.00 - 0.70 x10*3/uL    Basophils Absolute 0.06 0.00 - 0.10 x10*3/uL   Comprehensive Metabolic Panel   Result Value Ref Range    Glucose 155 (H) 74 - 99 mg/dL    Sodium 136 136 - 145 mmol/L    Potassium 4.3 3.5 - 5.3 mmol/L    Chloride 98 98 - 107 mmol/L    Bicarbonate 27 21 - 32 mmol/L    Anion Gap 15 10 - 20 mmol/L    Urea Nitrogen 12 6 - 23 mg/dL    Creatinine 0.58 0.50 - 1.05 mg/dL    eGFR >90 >60 mL/min/1.73m*2      Comment:      Calculations of estimated GFR are performed using the 2021 CKD-EPI Study Refit  equation without the race  variable for the IDMS-Traceable Creatinine Methods.    https://jasn.asnjournals.org/content/early/2021/09/22/ASN.3372881010    Calcium 9.3 8.6 - 10.3 mg/dL    Albumin 4.4 3.4 - 5.0 g/dL    Alkaline Phosphatase 71 33 - 110 U/L    Total Protein 7.0 6.4 - 8.2 g/dL    AST 22 9 - 39 U/L    Bilirubin, Total 0.5 0.0 - 1.2 mg/dL    ALT 35 7 - 45 U/L      Comment:      Patients treated with Sulfasalazine may generate falsely decreased results for ALT.   Lipid Panel   Result Value Ref Range    Cholesterol 287 (H) 0 - 199 mg/dL      Comment:            Age      Desirable   Borderline High   High     0-19 Y     0 - 169       170 - 199     >/= 200    20-24 Y     0 - 189       190 - 224     >/= 225         >24 Y     0 - 199       200 - 239     >/= 240   **All ranges are based on fasting samples. Specific   therapeutic targets will vary based on patient-specific   cardiac risk.    Pediatric guidelines reference:Pediatrics 2011, 128(S5).Adult guidelines reference: NCEP ATPIII Guidelines,BLADIMIR 2001, 258:2486-97    Venipuncture immediately after or during the administration of Metamizole may lead to falsely low results. Testing should be performed immediately prior to Metamizole dosing.    HDL-Cholesterol 39.7 mg/dL      Comment:        Age       Very Low   Low     Normal    High    0-19 Y    < 35      < 40     40-45     ----  20-24 Y    ----     < 40      >45      ----        >24 Y      ----     < 40     40-60      >60      Cholesterol/HDL Ratio 7.2       Comment:        Ref Values  Desirable  < 3.4  High Risk  > 5.0    LDL Calculated        Comment:      The calculation of LDL and VLDL are inaccurate when the Triglycerides are greater than 400 mg/dL or when the patient is non-fasting. If LDL measurement is necessary contact the testing laboratory for an alternative LDL assay.                                  Near   Borderline      AGE      Desirable  Optimal    High     High     Very High     0-19 Y     0 - 109     ---    110-129    >/= 130     ----    20-24 Y     0 - 119     ---    120-159   >/= 160     ----      >24 Y     0 -  99   100-129  130-159   160-189     >/=190      VLDL        Comment:      Unable to calculate VLDL.    Triglycerides 587 (H) 0 - 149 mg/dL      Comment:         Age         Desirable   Borderline High   High     Very High   0 D-90 D    19 - 174         ----         ----        ----  91 D- 9 Y     0 -  74        75 -  99     >/= 100      ----    10-19 Y     0 -  89        90 - 129     >/= 130      ----    20-24 Y     0 - 114       115 - 149     >/= 150      ----         >24 Y     0 - 149       150 - 199    200- 499    >/= 500    Venipuncture immediately after or during the administration of Metamizole may lead to falsely low results. Testing should be performed immediately prior to Metamizole dosing.    Non HDL Cholesterol 247 (H) 0 - 149 mg/dL      Comment:            Age       Desirable   Borderline High   High     Very High     0-19 Y     0 - 119       120 - 144     >/= 145    >/= 160    20-24 Y     0 - 149       150 - 189     >/= 190      ----         >24 Y    30 mg/dL above LDL Cholesterol goal       Labs are within normal limits or stable with although cholesterol still being high it is much better than last time.  Triglycerides are also much improved.  Blood sugar elevated.  Follow-up with me in the next month or 2 to go over things in detail and consider making further medication adjustments.       If you have any questions or concerns, please don't hesitate to call.         Sincerely,        DR CASSIDY LUCERO MD

## 2023-10-04 RX ORDER — ALBUTEROL SULFATE 90 UG/1
2 AEROSOL, METERED RESPIRATORY (INHALATION) EVERY 4 HOURS PRN
Qty: 18 G | Refills: 2 | Status: SHIPPED | OUTPATIENT
Start: 2023-10-04

## 2023-10-04 RX ORDER — BUTALBITAL, ACETAMINOPHEN AND CAFFEINE 50; 325; 40 MG/1; MG/1; MG/1
1-2 CAPSULE ORAL EVERY 6 HOURS PRN
Qty: 30 CAPSULE | Refills: 1 | Status: SHIPPED | OUTPATIENT
Start: 2023-10-04

## 2023-10-04 RX ORDER — ROSUVASTATIN CALCIUM 10 MG/1
10 TABLET, COATED ORAL DAILY
Qty: 90 TABLET | Refills: 1 | Status: SHIPPED | OUTPATIENT
Start: 2023-10-04 | End: 2024-01-15 | Stop reason: SDUPTHER

## 2023-10-04 NOTE — TELEPHONE ENCOUNTER
PATIENT CALLING BACK - STATES THAT SHE HAD NO CHOLESTEROL MEDICATION 2 WEEKS BEFORE BLOOD WORK - ALSO REPORTS THAT SHE FASTED 17 HOURS BEFORE SHE HAD LAB WORK DONE.    WILL AWAIT YOUR CALL BACK

## 2023-10-10 DIAGNOSIS — I10 HYPERTENSION, UNSPECIFIED TYPE: ICD-10-CM

## 2023-10-10 RX ORDER — IRBESARTAN AND HYDROCHLOROTHIAZIDE 300; 12.5 MG/1; MG/1
1 TABLET, FILM COATED ORAL DAILY
Qty: 30 TABLET | Refills: 0 | Status: SHIPPED | OUTPATIENT
Start: 2023-10-10 | End: 2023-11-13

## 2023-10-12 ENCOUNTER — OFFICE VISIT (OUTPATIENT)
Dept: PRIMARY CARE | Facility: CLINIC | Age: 53
End: 2023-10-12
Payer: COMMERCIAL

## 2023-10-12 VITALS
OXYGEN SATURATION: 92 % | HEART RATE: 112 BPM | TEMPERATURE: 98.8 F | WEIGHT: 201.4 LBS | BODY MASS INDEX: 37.06 KG/M2 | HEIGHT: 62 IN | DIASTOLIC BLOOD PRESSURE: 84 MMHG | SYSTOLIC BLOOD PRESSURE: 136 MMHG | RESPIRATION RATE: 16 BRPM

## 2023-10-12 DIAGNOSIS — E78.5 HYPERLIPIDEMIA, UNSPECIFIED HYPERLIPIDEMIA TYPE: ICD-10-CM

## 2023-10-12 DIAGNOSIS — R39.9 UTI SYMPTOMS: Primary | ICD-10-CM

## 2023-10-12 DIAGNOSIS — Z12.11 SPECIAL SCREENING FOR MALIGNANT NEOPLASM OF COLON: ICD-10-CM

## 2023-10-12 DIAGNOSIS — I10 HYPERTENSION, UNSPECIFIED TYPE: ICD-10-CM

## 2023-10-12 LAB
POC APPEARANCE, URINE: ABNORMAL
POC BILIRUBIN, URINE: NEGATIVE
POC BLOOD, URINE: NEGATIVE
POC COLOR, URINE: YELLOW
POC GLUCOSE, URINE: NEGATIVE MG/DL
POC KETONES, URINE: NEGATIVE MG/DL
POC LEUKOCYTES, URINE: NEGATIVE
POC NITRITE,URINE: POSITIVE
POC PH, URINE: 5.5 PH
POC PROTEIN, URINE: ABNORMAL MG/DL
POC SPECIFIC GRAVITY, URINE: >=1.03
POC UROBILINOGEN, URINE: 0.2 EU/DL

## 2023-10-12 PROCEDURE — 81003 URINALYSIS AUTO W/O SCOPE: CPT | Performed by: FAMILY MEDICINE

## 2023-10-12 PROCEDURE — 3075F SYST BP GE 130 - 139MM HG: CPT | Performed by: FAMILY MEDICINE

## 2023-10-12 PROCEDURE — 99213 OFFICE O/P EST LOW 20 MIN: CPT | Performed by: FAMILY MEDICINE

## 2023-10-12 PROCEDURE — 3079F DIAST BP 80-89 MM HG: CPT | Performed by: FAMILY MEDICINE

## 2023-10-12 PROCEDURE — 87086 URINE CULTURE/COLONY COUNT: CPT

## 2023-10-12 RX ORDER — NITROFURANTOIN 25; 75 MG/1; MG/1
100 CAPSULE ORAL 2 TIMES DAILY
Qty: 14 CAPSULE | Refills: 0 | Status: SHIPPED | OUTPATIENT
Start: 2023-10-12 | End: 2023-10-19

## 2023-10-12 ASSESSMENT — PATIENT HEALTH QUESTIONNAIRE - PHQ9
2. FEELING DOWN, DEPRESSED OR HOPELESS: NOT AT ALL
1. LITTLE INTEREST OR PLEASURE IN DOING THINGS: NOT AT ALL
SUM OF ALL RESPONSES TO PHQ9 QUESTIONS 1 AND 2: 0

## 2023-10-12 NOTE — PROGRESS NOTES
Subjective   Patient ID: Giulia Vaz is a 53 y.o. female who presents for UTI Symptoms.  HPI  Dr. Avila patient presents today complaining of bladder pain, and urinary frequency x 3 days. This I worsening. She has noticed pus in her urine. No blood. Took OTC Uro Tabs with no relief.      Taking current medications which were reviewed.  Problem list discussed.    Overall doing well.  Eating okay.  Staying active.    Has no other new problem /question.    Review of Systems  Constitutional:  no chills, no fever and no night sweats.  Eyes: no blurred vision and no eyesight problems.  ENT: no hearing loss, no nasal congestion, no hoarseness and no sore throat.  Neck: no mass (es) and no swelling.  Cardiovascular: no chest pain, no intermittent leg claudication, no lower extremity edema, no palpitation and no syncope.  Respiratory: no cough, no shortness of breath during exertion, no shortness of breath at rest and no wheezing.  Gastrointestinal: no abdominal pain, no blood in stools, no constipation, no diarrhea, no melena, no nausea, no rectal pain and no vomiting.  Genitourinary: no dysuria, no change in urinary frequency, no urinary hesitancy and no feelings of urinary urgency.  Musculoskeletal: no arthralgias, no back pain and no myalgias.  Integumentary: no new skin lesions and no rashes.  Neurological: no difficulty walking, no headache, no limb weakness, no numbness and no tingling.  Psychiatric/Behavioral: no anxiety, no depression, no anhedonia and no substance use disorders.  Endocrine: no recent weight gain and no recent weight loss.  Hematologic/Lymphatic: no tendency for easy bruising and no swollen glands    Objective   Physical Exam  Patient in with complaints of polyuria dysuria some suprapubic pain.  Worsening over the past 2 to 3 days.  Does not typically like bladder infections but has had them in the past.  Exam she has mild low back pain suprapubic discomfort no rebound or guarding lungs are clear  "urinalysis shows nitrates and blood consistent with UTI.  /84   Pulse (!) 112   Temp 37.1 °C (98.8 °F)   Resp 16   Ht 1.575 m (5' 2\")   Wt 91.4 kg (201 lb 6.4 oz)   SpO2 92%   BMI 36.84 kg/m²     Lab Results   Component Value Date    WBC 8.1 10/02/2023    HGB 15.9 10/02/2023    HCT 49.4 (H) 10/02/2023    MCV 96 10/02/2023     10/02/2023       Assessment/Plan she has allergies for most medications we will put her on Macrobid 100 mg twice daily for a week send urine for culture and follow-up if not improving.  Problem List Items Addressed This Visit       Hyperlipidemia    Hypertension     Other Visit Diagnoses       UTI symptoms    -  Primary    Relevant Orders    POCT UA Automated manually resulted (Completed)    Special screening for malignant neoplasm of colon        Relevant Orders    Colonoscopy Screening            "

## 2023-10-14 LAB — BACTERIA UR CULT: NORMAL

## 2023-10-19 DIAGNOSIS — Z12.11 COLON CANCER SCREENING: ICD-10-CM

## 2023-10-20 RX ORDER — SODIUM, POTASSIUM,MAG SULFATES 17.5-3.13G
1 SOLUTION, RECONSTITUTED, ORAL ORAL 2 TIMES DAILY
Qty: 2 EACH | Refills: 0 | Status: SHIPPED | OUTPATIENT
Start: 2023-10-20 | End: 2023-11-14 | Stop reason: ALTCHOICE

## 2023-11-08 ENCOUNTER — PREP FOR PROCEDURE (OUTPATIENT)
Dept: GASTROENTEROLOGY | Facility: HOSPITAL | Age: 53
End: 2023-11-08
Payer: COMMERCIAL

## 2023-11-11 DIAGNOSIS — I10 HYPERTENSION, UNSPECIFIED TYPE: ICD-10-CM

## 2023-11-13 RX ORDER — IRBESARTAN AND HYDROCHLOROTHIAZIDE 300; 12.5 MG/1; MG/1
1 TABLET, FILM COATED ORAL DAILY
Qty: 30 TABLET | Refills: 4 | Status: SHIPPED | OUTPATIENT
Start: 2023-11-13 | End: 2024-02-28

## 2023-11-14 ENCOUNTER — ANESTHESIA (OUTPATIENT)
Dept: GASTROENTEROLOGY | Facility: HOSPITAL | Age: 53
End: 2023-11-14
Payer: COMMERCIAL

## 2023-11-14 ENCOUNTER — HOSPITAL ENCOUNTER (OUTPATIENT)
Dept: GASTROENTEROLOGY | Facility: HOSPITAL | Age: 53
Setting detail: OUTPATIENT SURGERY
Discharge: HOME | End: 2023-11-14
Payer: COMMERCIAL

## 2023-11-14 ENCOUNTER — ANESTHESIA EVENT (OUTPATIENT)
Dept: GASTROENTEROLOGY | Facility: HOSPITAL | Age: 53
End: 2023-11-14
Payer: COMMERCIAL

## 2023-11-14 VITALS
TEMPERATURE: 98.6 F | DIASTOLIC BLOOD PRESSURE: 76 MMHG | OXYGEN SATURATION: 94 % | HEIGHT: 63 IN | HEART RATE: 107 BPM | BODY MASS INDEX: 35.44 KG/M2 | WEIGHT: 200 LBS | SYSTOLIC BLOOD PRESSURE: 116 MMHG | RESPIRATION RATE: 18 BRPM

## 2023-11-14 DIAGNOSIS — Z12.11 SPECIAL SCREENING FOR MALIGNANT NEOPLASM OF COLON: Primary | ICD-10-CM

## 2023-11-14 PROCEDURE — 3700000002 HC GENERAL ANESTHESIA TIME - EACH INCREMENTAL 1 MINUTE: Performed by: INTERNAL MEDICINE

## 2023-11-14 PROCEDURE — 88305 TISSUE EXAM BY PATHOLOGIST: CPT | Mod: TC,SUR,STJLAB | Performed by: INTERNAL MEDICINE

## 2023-11-14 PROCEDURE — 45385 COLONOSCOPY W/LESION REMOVAL: CPT | Performed by: INTERNAL MEDICINE

## 2023-11-14 PROCEDURE — 7100000010 HC PHASE TWO TIME - EACH INCREMENTAL 1 MINUTE: Performed by: INTERNAL MEDICINE

## 2023-11-14 PROCEDURE — 3700000001 HC GENERAL ANESTHESIA TIME - INITIAL BASE CHARGE: Performed by: INTERNAL MEDICINE

## 2023-11-14 PROCEDURE — 45380 COLONOSCOPY AND BIOPSY: CPT | Performed by: INTERNAL MEDICINE

## 2023-11-14 PROCEDURE — 7100000009 HC PHASE TWO TIME - INITIAL BASE CHARGE: Performed by: INTERNAL MEDICINE

## 2023-11-14 PROCEDURE — 2500000004 HC RX 250 GENERAL PHARMACY W/ HCPCS (ALT 636 FOR OP/ED): Performed by: ANESTHESIOLOGIST ASSISTANT

## 2023-11-14 PROCEDURE — 88305 TISSUE EXAM BY PATHOLOGIST: CPT | Performed by: PATHOLOGY

## 2023-11-14 PROCEDURE — 2500000005 HC RX 250 GENERAL PHARMACY W/O HCPCS: Performed by: ANESTHESIOLOGIST ASSISTANT

## 2023-11-14 PROCEDURE — A45380 PR COLONOSCOPY,BIOPSY: Performed by: ANESTHESIOLOGIST ASSISTANT

## 2023-11-14 PROCEDURE — 2500000004 HC RX 250 GENERAL PHARMACY W/ HCPCS (ALT 636 FOR OP/ED): Performed by: INTERNAL MEDICINE

## 2023-11-14 PROCEDURE — A45380 PR COLONOSCOPY,BIOPSY: Performed by: ANESTHESIOLOGY

## 2023-11-14 PROCEDURE — 88305 TISSUE EXAM BY PATHOLOGIST: CPT | Mod: TC,STJLAB | Performed by: INTERNAL MEDICINE

## 2023-11-14 RX ORDER — SODIUM CHLORIDE, SODIUM LACTATE, POTASSIUM CHLORIDE, CALCIUM CHLORIDE 600; 310; 30; 20 MG/100ML; MG/100ML; MG/100ML; MG/100ML
20 INJECTION, SOLUTION INTRAVENOUS CONTINUOUS
Status: DISCONTINUED | OUTPATIENT
Start: 2023-11-14 | End: 2023-11-15 | Stop reason: HOSPADM

## 2023-11-14 RX ORDER — LIDOCAINE HYDROCHLORIDE 20 MG/ML
INJECTION, SOLUTION EPIDURAL; INFILTRATION; INTRACAUDAL; PERINEURAL AS NEEDED
Status: DISCONTINUED | OUTPATIENT
Start: 2023-11-14 | End: 2023-11-14

## 2023-11-14 RX ORDER — PROPOFOL 10 MG/ML
INJECTION, EMULSION INTRAVENOUS AS NEEDED
Status: DISCONTINUED | OUTPATIENT
Start: 2023-11-14 | End: 2023-11-14

## 2023-11-14 RX ADMIN — SODIUM CHLORIDE, POTASSIUM CHLORIDE, SODIUM LACTATE AND CALCIUM CHLORIDE: 600; 310; 30; 20 INJECTION, SOLUTION INTRAVENOUS at 12:54

## 2023-11-14 RX ADMIN — LIDOCAINE HYDROCHLORIDE 100 MG: 20 INJECTION, SOLUTION EPIDURAL; INFILTRATION; INTRACAUDAL; PERINEURAL at 13:05

## 2023-11-14 RX ADMIN — PROPOFOL 500 MG: 10 INJECTION, EMULSION INTRAVENOUS at 12:59

## 2023-11-14 SDOH — HEALTH STABILITY: MENTAL HEALTH: CURRENT SMOKER: 1

## 2023-11-14 ASSESSMENT — PAIN SCALES - GENERAL
PAINLEVEL_OUTOF10: 0 - NO PAIN
PAIN_LEVEL: 0
PAINLEVEL_OUTOF10: 0 - NO PAIN
PAINLEVEL_OUTOF10: 0 - NO PAIN

## 2023-11-14 ASSESSMENT — PAIN - FUNCTIONAL ASSESSMENT
PAIN_FUNCTIONAL_ASSESSMENT: 0-10

## 2023-11-14 ASSESSMENT — COLUMBIA-SUICIDE SEVERITY RATING SCALE - C-SSRS
1. IN THE PAST MONTH, HAVE YOU WISHED YOU WERE DEAD OR WISHED YOU COULD GO TO SLEEP AND NOT WAKE UP?: NO
6. HAVE YOU EVER DONE ANYTHING, STARTED TO DO ANYTHING, OR PREPARED TO DO ANYTHING TO END YOUR LIFE?: NO
2. HAVE YOU ACTUALLY HAD ANY THOUGHTS OF KILLING YOURSELF?: NO

## 2023-11-14 NOTE — ANESTHESIA POSTPROCEDURE EVALUATION
Patient: Giulia Vaz    Procedure Summary       Date: 11/14/23 Room / Location: [facility]    Anesthesia Start: 1254 Anesthesia Stop: 1328    Procedure: COLONOSCOPY Diagnosis:       Special screening for malignant neoplasm of colon      Special screening for malignant neoplasm of colon    Scheduled Providers: Shaw Renteria MD Responsible Provider: Raghav Benitez MD    Anesthesia Type: MAC ASA Status: 2            Anesthesia Type: MAC    Vitals Value Taken Time   /89 11/14/23 1327   Temp 37 °C (98.6 °F) 11/14/23 1327   Pulse 102 11/14/23 1327   Resp 18 11/14/23 1327   SpO2 94 % 11/14/23 1327       Anesthesia Post Evaluation    Patient location during evaluation: PACU  Patient participation: complete - patient participated  Level of consciousness: awake and alert  Pain score: 0  Pain management: adequate  Airway patency: patent  Cardiovascular status: acceptable  Respiratory status: acceptable  Hydration status: acceptable        No notable events documented.

## 2023-11-14 NOTE — ANESTHESIA PREPROCEDURE EVALUATION
Patient: Giulia Vaz    Procedure Information       Date/Time: 11/14/23 1300    Scheduled providers: Shaw Renteria MD    Procedure: COLONOSCOPY    [place] [facility] [place] Problems   No relevant active problems       Clinical information reviewed:   Tobacco  Allergies  Meds   Med Hx  Surg Hx   Fam Hx  Soc Hx        NPO Detail:  NPO/Void Status  Carbonhydrate Drink Given Prior to Surgery? : N  Date of Last Liquid: 11/14/23  Time of Last Liquid: 0600  Date of Last Solid: 11/13/23  Time of Last Solid: 1800  Last Intake Type: Clear fluids  Time of Last Void: 1000         Physical Exam    Airway  Mallampati: II  TM distance: >3 FB  Neck ROM: full     Cardiovascular   Rhythm: regular  Rate: normal     Dental - normal exam     Pulmonary - normal exam     Abdominal          Anesthesia Plan    ASA 2     MAC     The patient is a current smoker.    intravenous induction   Anesthetic plan and risks discussed with patient.    Plan discussed with CAA.

## 2023-11-14 NOTE — PRE-SEDATION DOCUMENTATION
Patient: Giulia Vaz  MRN: [id number]    Pre-sedation Evaluation:  Sedation necessary for: Immobility and Analgesia  Requesting service: GI    History of Present Illness:   Colon cancer screening      Past Medical History:   Diagnosis Date    Body mass index (BMI) 36.0-36.9, adult 01/26/2022    BMI 36.0-36.9,adult    Body mass index (BMI) 36.0-36.9, adult 01/17/2022    BMI 36.0-36.9,adult    Contact with and (suspected) exposure to covid-19 04/24/2020    Exposure to COVID-19 virus    Encounter for follow-up examination after completed treatment for conditions other than malignant neoplasm 01/17/2022    Hospital discharge follow-up    Encounter for general adult medical examination without abnormal findings 07/26/2019    Well adult exam    Encounter for immunization 11/09/2020    Encounter for immunization    Encounter for other screening for malignant neoplasm of breast 11/09/2020    Breast cancer screening    Encounter for screening for malignant neoplasm of colon 01/05/2022    Screen for colon cancer    Hemorrhage of anus and rectum 12/01/2016    Anal hemorrhage    Morbid (severe) obesity due to excess calories (CMS/HCC) 01/26/2022    Class 2 severe obesity due to excess calories with serious comorbidity and body mass index (BMI) of 36.0 to 36.9 in adult    Morbid (severe) obesity due to excess calories (CMS/HCC) 01/17/2022    Class 2 severe obesity due to excess calories with serious comorbidity and body mass index (BMI) of 36.0 to 36.9 in adult    Other acute sinusitis 06/28/2019    Other acute sinusitis, recurrence not specified    Other fecal abnormalities 04/24/2020    Loose stools    Other specified diseases of anus and rectum 04/24/2020    Rectal pain    Other urticaria 01/17/2022    Urticaria, acute    Personal history of other diseases of the digestive system 12/19/2016    History of anal fissures    Personal history of other diseases of the nervous system and sense organs     History of migraine     Personal history of other diseases of the respiratory system     History of bronchitis    Personal history of other diseases of the respiratory system 04/24/2020    History of upper respiratory infection    Personal history of other infectious and parasitic diseases     History of varicella    Personal history of other medical treatment 08/15/2019    History of screening mammography    Personal history of other specified conditions     History of wheezing    Personal history of pneumonia (recurrent)     History of pneumonia    Rash and other nonspecific skin eruption 01/17/2022    Rash    Sciatica, left side 09/14/2019    Sciatica of left side    Strain of muscle, fascia and tendon of lower back, initial encounter 09/14/2019    Strain of lumbar region, initial encounter    Unspecified acute lower respiratory infection 04/08/2021    Lower resp. tract infection    Unspecified disorder of vestibular function, unspecified ear 04/08/2021    Vestibular dizziness       Principle problems:  Patient Active Problem List    Diagnosis Date Noted    Anal fissure 03/19/2023    Back pain 03/19/2023    Candidiasis of breast 03/19/2023    Dizziness 03/19/2023    Fecal soiling 03/19/2023    Fluid level behind tympanic membrane of both ears 03/19/2023    Lumbar herniated disc 03/19/2023    Hyperlipidemia 03/19/2023    Hypertension 03/19/2023    Left lumbar radiculopathy 03/19/2023    Migraines 03/19/2023    Osteoarthritis of spine with radiculopathy, lumbar region 03/19/2023    Rectal fissure 03/19/2023    Rectal pain 03/19/2023    Sciatica of right side 03/19/2023    Seasonal allergies 03/19/2023    Weakness of left lower extremity 03/19/2023    Bacterial conjunctivitis of right eye 03/19/2023    Class 2 severe obesity due to excess calories with serious comorbidity and body mass index (BMI) of 37.0 to 37.9 in adult (CMS/[facility]) 03/19/2023    Left ear pain 03/19/2023    Muscle strain 03/19/2023    Otitis externa, left 03/19/2023      Allergies:  Allergies   Allergen Reactions    Ampicillin Hives    Amoxicillin-Pot Clavulanate Rash    Cefuroxime Axetil Rash    Ciprofloxacin Rash    Sulfur Rash     PTA/Current Medications:  (Not in a hospital admission)    Current Outpatient Medications   Medication Sig Dispense Refill    irbesartan-hydrochlorothiazide (Avalide) 300-12.5 mg tablet take 1 tablet by mouth once daily 30 tablet 4    rosuvastatin (Crestor) 10 mg tablet Take 1 tablet (10 mg) by mouth once daily. 90 tablet 1    albuterol 2.5 mg /3 mL (0.083 %) nebulizer solution Inhale every 6 hours if needed. USE 1 UNIT DOSE IN NEBULIZER      albuterol 90 mcg/actuation inhaler Inhale 2 puffs every 4 hours if needed for wheezing. (Patient not taking: Reported on 11/14/2023) 18 g 2    butalbital-acetaminophen-caff (Esgic) -40 mg capsule Take 1-2 capsules by mouth every 6 hours if needed for migraine. (Patient not taking: Reported on 11/14/2023) 30 capsule 1    famotidine (Pepcid) 40 mg tablet Take 1 tablet (40 mg) by mouth 2 times a day.      icosapent ethyL (Vascepa) 1 gram capsule Take 2 capsules (2 g) by mouth 2 times a day.      polymyxin B sulf-trimethoprim (Polytrim) ophthalmic solution Administer 1 drop into affected eye(s). Every 4-6 hours - 33389-6.1 UNIT/ML-%       Current Facility-Administered Medications   Medication Dose Route Frequency Provider Last Rate Last Admin    lactated Ringer's infusion  20 mL/hr intravenous Continuous Shaw Renteria MD         Past Surgical History:   has a past surgical history that includes Other surgical history (11/09/2020) and Other surgical history (05/14/2019).    Recent sedation/surgery (24 hours) No    Review of Systems:  Please check all that apply: Obesity    Pregnancy test completed prior to procedure on any menstruating female: none        NPO guidelines met: Yes    Physical Exam    Airway  Mallampati: II     Cardiovascular   Rhythm: regular  Rate: normal     Dental    Pulmonary   Breath  sounds clear to auscultation         Plan    ASA 2     Deep   (This is my H and P )

## 2023-11-24 LAB
LABORATORY COMMENT REPORT: NORMAL
PATH REPORT.FINAL DX SPEC: NORMAL
PATH REPORT.GROSS SPEC: NORMAL
PATH REPORT.RELEVANT HX SPEC: NORMAL
PATH REPORT.TOTAL CANCER: NORMAL

## 2023-12-03 NOTE — RESULT ENCOUNTER NOTE
During recent colonoscopy multiple polyps were seen and completely removed.  Pathology results show these polyps as tubular adenoma.  This kind of polyp is benign but precancerous.  Based on pathology results I recommend repeating colonoscopy in 5 years.  Do not hesitate to contact me if you have any questions or concerns.  Sincerely,

## 2024-01-04 ENCOUNTER — PATIENT OUTREACH (OUTPATIENT)
Dept: PRIMARY CARE | Facility: CLINIC | Age: 54
End: 2024-01-04
Payer: COMMERCIAL

## 2024-01-04 NOTE — PROGRESS NOTES
Discharge Facility: [facility]  Discharge Diagnosis: Acute respiratory failure with hypoxia due to COVID-19   Admission Date: 1/2/2024  Discharge Date: 1/3/2024    PCP Appointment Date: 1/10/2024  Specialist Appointment Date:   Hospital Encounter and Summary: Linked   See discharge assessment below for further details  Engagement  Call Start Time: 1044 (1/4/2024 11:01 AM)    Medications  Medications reviewed with patient/caregiver?: Yes (meds discussed) (1/4/2024 11:01 AM)  Is the patient having any side effects they believe may be caused by any medication additions or changes?: No (1/4/2024 11:01 AM)  Does the patient have all medications ordered at discharge?: Yes (1/4/2024 11:01 AM)  Care Management Interventions: No intervention needed (1/4/2024 11:01 AM)  Prescription Comments: see med list (paxlovid) (1/4/2024 11:01 AM)  Is the patient taking all medications as directed (includes completed medication regime)?: Yes (1/4/2024 11:01 AM)  Care Management Interventions: Provided patient education (1/4/2024 11:01 AM)  Medication Comments: DO NOT TAKE ATORVASTATIN while on Paxlovid (1/4/2024 11:01 AM)    Appointments  Does the patient have a primary care provider?: Yes (1/4/2024 11:01 AM)  Care Management Interventions: Verified appointment date/time/provider (1/4/2024 11:01 AM)  Has the patient kept scheduled appointments due by today?: Yes (1/4/2024 11:01 AM)  Care Management Interventions: Advised patient to keep appointment (1/4/2024 11:01 AM)    Self Management  What is the home health agency?: denies need (1/4/2024 11:01 AM)    Patient Teaching  Does the patient have access to their discharge instructions?: Yes (1/4/2024 11:01 AM)  Care Management Interventions: Reviewed instructions with patient (1/4/2024 11:01 AM)  What is the patient's perception of their health status since discharge?: Improving (1/4/2024 11:01 AM)  Is the patient/caregiver able to teach back the hierarchy of who to call/visit for  "symptoms/problems? PCP, Specialist, Home Health nurse, Urgent Care, ED, 911: Yes (1/4/2024 11:01 AM)  Patient/Caregiver Education Comments: Patient states she is still not feeling well and \"I almost drove right back to the ER after I was dischargd\" because she started having severe sharp pains in her spine with cough. States she continues to have cough since coming home. Also mentioned she has been checking her oxygen levels with pulse ox at home and it has been consistently in the 80s. States \"sometimes it bumps uop to about 92%\" but states even when she is sitting and resting it is about 88%. Instructed patient to go back to the ER at this point if her Oxygen levels are not about 90%. Verbalized understanding of this. (1/4/2024 11:01 AM)        "

## 2024-01-10 ENCOUNTER — APPOINTMENT (OUTPATIENT)
Dept: PRIMARY CARE | Facility: CLINIC | Age: 54
End: 2024-01-10
Payer: COMMERCIAL

## 2024-01-11 ENCOUNTER — DOCUMENTATION (OUTPATIENT)
Dept: PRIMARY CARE | Facility: CLINIC | Age: 54
End: 2024-01-11
Payer: COMMERCIAL

## 2024-01-11 DIAGNOSIS — J45.909 REACTIVE AIRWAY DISEASE WITHOUT COMPLICATION, UNSPECIFIED ASTHMA SEVERITY, UNSPECIFIED WHETHER PERSISTENT (HHS-HCC): ICD-10-CM

## 2024-01-11 DIAGNOSIS — J44.9 CHRONIC OBSTRUCTIVE PULMONARY DISEASE, UNSPECIFIED COPD TYPE (MULTI): ICD-10-CM

## 2024-01-11 DIAGNOSIS — R06.2 WHEEZING: ICD-10-CM

## 2024-01-11 RX ORDER — LORAZEPAM 0.5 MG/1
0.5 TABLET ORAL EVERY 12 HOURS PRN
COMMUNITY
Start: 2024-01-10 | End: 2024-02-29 | Stop reason: ALTCHOICE

## 2024-01-11 RX ORDER — PREDNISONE 10 MG/1
TABLET ORAL
COMMUNITY
Start: 2024-01-10 | End: 2024-01-22 | Stop reason: ALTCHOICE

## 2024-01-11 RX ORDER — NICOTINE 7MG/24HR
1 PATCH, TRANSDERMAL 24 HOURS TRANSDERMAL
COMMUNITY
Start: 2024-01-27 | End: 2024-05-08 | Stop reason: SDUPTHER

## 2024-01-11 RX ORDER — NEBULIZER AND COMPRESSOR
EACH MISCELLANEOUS
Qty: 1 EACH | Refills: 0 | Status: SHIPPED | OUTPATIENT
Start: 2024-01-11 | End: 2024-01-12 | Stop reason: SDUPTHER

## 2024-01-11 RX ORDER — IBUPROFEN 200 MG
1 TABLET ORAL
COMMUNITY
Start: 2024-01-11 | End: 2024-05-08 | Stop reason: SDUPTHER

## 2024-01-11 RX ORDER — VALSARTAN 80 MG/1
80 TABLET ORAL 2 TIMES DAILY
COMMUNITY
Start: 2024-01-10 | End: 2024-02-09

## 2024-01-11 RX ORDER — IPRATROPIUM BROMIDE AND ALBUTEROL SULFATE 2.5; .5 MG/3ML; MG/3ML
3 SOLUTION RESPIRATORY (INHALATION) EVERY 6 HOURS PRN
COMMUNITY
Start: 2024-01-10 | End: 2024-01-15 | Stop reason: SDUPTHER

## 2024-01-11 RX ORDER — IBUPROFEN 200 MG
1 TABLET ORAL
COMMUNITY
Start: 2024-01-19 | End: 2024-05-08 | Stop reason: SDUPTHER

## 2024-01-11 NOTE — PROGRESS NOTES
Patient is asking prescription for a nebulizer machine. States she has the medications from the hospital but they did not order her a machine.

## 2024-01-11 NOTE — PROGRESS NOTES
Subjective   Patient ID: Giulia Vaz is a 53 y.o. female who presents for Hospital Follow-up.    HPI  Presents for above reason  Discharge Facility: [facility]  Discharge Diagnosis: Acute respiratory failure with hypoxia due to COVID-19   Admission Date: 1/4/2024  Discharge Date: 1/10/2024  Pt to see pulmonology 1/30/2024  Pt reports she has 3 infections  States her sugar levels were not god  Pt states her vision is double and she is feeling dizzy lately  Should she be checked for diabetes  Pt was being treated with insulin  Pt on regular medications and prednisone was given  Pt has albuterol machine  Pt has stopped smoking and using Nicoderm patch    ROS  Constitutional- No activity change. No appetite change.  Eyes- Denies vision changes.  Respiratory- No shortness of breath.  Cardiovascular- No palpitations. No chest pain.  GI- No nausea or vomiting. No diarrhea or constipation. Denies abdominal pain.  Musculoskeletal- Denies joint swelling.  Extremities- No edema.  Neurological- Denies headaches. Denies dizziness.  Skin- No rashes.  Psychiatric/Behavioral- Denies significant anxiety, or depressed mood.     Objective     /60   Pulse 76   Temp 36.4 °C (97.6 °F)   Resp 18   Wt 89.4 kg (197 lb 3.2 oz)   SpO2 97%   BMI 34.93 kg/m²     Allergies   Allergen Reactions    Ampicillin Hives    Amoxicillin-Pot Clavulanate Rash    Cefuroxime Axetil Rash    Ciprofloxacin Rash    Sulfur Rash       Constitutional-- Well-nourished.  No distress  Eyes- PERRL.  Conjunctiva normal.  Nose- Normal.  No rhinorrhea noted.  Throat- Oropharynx is clear and moist.  Neck- Supple with no thyromegaly.  No significant cervical adenopathy noted.  Pulmonary/Chest- Breath sounds normal with normal effort.  No wheezing.  Heart- Regular rate and rhythm.  No murmur.  Abdomen- Soft and non-tender.  No masses noted.  Musculoskeletal- Normal ROM.  No significant joint swelling  Extremities- No edema.   Neurological- Alert.  No  noted deficits.  Skin- Warm.      Assessment/Plan   1. Elevated glucose  POCT Fingerstick Glucose manually resulted    glimepiride (AmaryL) 2 mg tablet      2. Hyperlipidemia, unspecified hyperlipidemia type  rosuvastatin (Crestor) 10 mg tablet      3. Wheezing  ipratropium-albuteroL (Duo-Neb) 0.5-2.5 mg/3 mL nebulizer solution      4. Hypertension, unspecified type        5. Osteoarthritis of spine with radiculopathy, lumbar region               Long talk. Treatment options reviewed.  Spent 30 minutes with the patient, with at least 1/2 of the time spent in counseling and instruction.    Discussed recent hospital admission. Reviewed most recent lab work with patient.  Will continue to monitor.  Blood sugar in the office today was over 400.  Talked about how her steroids combined with probably early diabetes which was never confirmed with her last blood work is the likely cause.  She is due to come off the prednisone by the end of the week but will taper her off a little quicker.  She will take 10 mg tomorrow then 5 mg the next day then discontinue.  Will start glimepiride to be taken daily as directed.    Hypertension controlled.     Osteoarthritis controlled. Educated the patient on osteoarthritis care and management. Educated on muscle strength and exercise.    Continue and take your medications as prescribed.    Health Maintenance issues discussed.    Importance of healthy diet and regular exercise regimen discussed.  Close follow-up with me in 1 week.  Urinary frequency should resolve by the end of the week and if not she will call and we may have her come in for an extra blood sugar test.    Follow-up as instructed or sooner if any problems or symptoms do not resolve as expected.      Scribe Attestation  By signing my name below, IClementine Scribe   attest that this documentation has been prepared under the direction and in the presence of Andrae Avila MD.

## 2024-01-11 NOTE — PROGRESS NOTES
Discharge Facility: [facility]  Discharge Diagnosis: COVID-19   Admission Date: 1/4/2024  Discharge Date: 1/10/2024    PCP Appointment Date: 1/15/2024  Specialist Appointment Date: 1/30 pulmonology  Hospital Encounter and Summary: Linked  See discharge assessment below for further details  Engagement  Call Start Time: 1044 (1/4/2024 11:01 AM)    Medications  Medications reviewed with patient/caregiver?: Yes (1/11/2024 12:04 PM)  Is the patient having any side effects they believe may be caused by any medication additions or changes?: No (1/11/2024 12:04 PM)  Does the patient have all medications ordered at discharge?: Yes (1/11/2024 12:04 PM)  Care Management Interventions: No intervention needed (1/11/2024 12:04 PM)  Prescription Comments: see med list (paxlovid) (1/4/2024 11:01 AM)  Is the patient taking all medications as directed (includes completed medication regime)?: Yes (1/11/2024 12:04 PM)  Care Management Interventions: Provided patient education (1/11/2024 12:04 PM)  Medication Comments: DO NOT TAKE ATORVASTATIN while on Paxlovid (1/4/2024 11:01 AM)    Appointments  Does the patient have a primary care provider?: Yes (1/11/2024 12:04 PM)  Care Management Interventions: Verified appointment date/time/provider (1/11/2024 12:04 PM)  Has the patient kept scheduled appointments due by today?: Yes (1/11/2024 12:04 PM)  Care Management Interventions: Advised patient to keep appointment (1/11/2024 12:04 PM)    Self Management  What is the home health agency?: denies need (1/11/2024 12:04 PM)    Patient Teaching  Does the patient have access to their discharge instructions?: Yes (1/11/2024 12:04 PM)  Care Management Interventions: Reviewed instructions with patient (1/11/2024 12:04 PM)  What is the patient's perception of their health status since discharge?: Improving (1/11/2024 12:04 PM)  Is the patient/caregiver able to teach back the hierarchy of who to call/visit for symptoms/problems? PCP, Specialist,  "Home Health nurse, Urgent Care, ED, 911: Yes (1/11/2024 12:04 PM)  Patient/Caregiver Education Comments: Patient states she is happy to be home now but states she is still feeling \"pretty weak\". States she is definitely breathing better now but still needs a nebulizer machine as the hospital doctor only prescribed medications for her. States her  is there to help her when he is not working. Medications and follow up appts discussed. (1/11/2024 12:04 PM)        "

## 2024-01-11 NOTE — TELEPHONE ENCOUNTER
[facility] does not dispense nebulizers. Called patient to notify, she requested it be sent to [facility]. Pending approval.

## 2024-01-11 NOTE — TELEPHONE ENCOUNTER
Called patient and she is aware. She would like to have this sent to her pharmacy, [facility] in [place]  Aware you are out of the office today

## 2024-01-12 ENCOUNTER — TELEPHONE (OUTPATIENT)
Dept: PRIMARY CARE | Facility: CLINIC | Age: 54
End: 2024-01-12
Payer: COMMERCIAL

## 2024-01-12 DIAGNOSIS — F41.9 ANXIETY: Primary | ICD-10-CM

## 2024-01-12 RX ORDER — NEBULIZER AND COMPRESSOR
EACH MISCELLANEOUS
Qty: 1 EACH | Refills: 0 | Status: SHIPPED | OUTPATIENT
Start: 2024-01-12

## 2024-01-12 RX ORDER — LORAZEPAM 0.5 MG/1
0.5 TABLET ORAL EVERY 8 HOURS PRN
Qty: 30 TABLET | Refills: 0 | Status: SHIPPED | OUTPATIENT
Start: 2024-01-12 | End: 2024-01-22 | Stop reason: SDUPTHER

## 2024-01-12 NOTE — TELEPHONE ENCOUNTER
Patient is aware.     Andrae Avila MD  Do [id number] Clinical Support Staff1 minute ago (1:43 PM)         E prescribed med to their pharmacy.  Call patient and inform.

## 2024-01-12 NOTE — TELEPHONE ENCOUNTER
CAN THE NEBULIZER PLEASE BE REORDERED.    INSURANCE WILL NOT COVER WITH JUST THE DIAGNOSIS OF WHEEZING.    IF THAT CAN BE RE ORDER WITH THE DIAGNOSIS THAT DR. LUCERO HAD MENTIONED WHEN HE OK'D THE SCRIPT    PLEASE ADD COPD AND RAD.    SEND TO [facility] ZANE

## 2024-01-12 NOTE — TELEPHONE ENCOUNTER
RX sent to Dr. Avila for approval.     Andrae Avila MD  Do [id number] [id number] hours ago (12:37 PM)       Okay for patient to get a nebulizer machine.  Diagnosis would be COPD and reactive airway disease.  May be she can get one from our office or we can arrange to have it sent to her drugstore.  Please arrange and let her know

## 2024-01-12 NOTE — TELEPHONE ENCOUNTER
Patient is calling because she's just being discharged from the hospital. She's waiting for [facility] to get her her nebulizer. In the meantime, she wanted to know if she could get a rx refill on her Ativan that they gave her while in the hospital. She's having anxiety from quitting smoking and being on steroids. She is aware Dr Avila is done for the day but wanted to know if this could be addressed by one of the other providers    Rx Refill Request Telephone Encounter    Name:  Giulia Vaz  : 1970     Medication Name:  Ativan  Dose (Optional):    0.5mg  Quantity (Optional):      Directions (Optional):   Take 1 tablet (0.5mg) by mouth every 12 hours prn     ALLERGIES:   see list     Specific Pharmacy location:  [facility]    Date of last appointment:  10/12/23  Date of next appointment:  1/15/2    Best number to reach patient:  [phone]

## 2024-01-15 ENCOUNTER — OFFICE VISIT (OUTPATIENT)
Dept: PRIMARY CARE | Facility: CLINIC | Age: 54
End: 2024-01-15
Payer: COMMERCIAL

## 2024-01-15 VITALS
HEART RATE: 76 BPM | SYSTOLIC BLOOD PRESSURE: 104 MMHG | RESPIRATION RATE: 18 BRPM | DIASTOLIC BLOOD PRESSURE: 60 MMHG | TEMPERATURE: 97.6 F | OXYGEN SATURATION: 97 % | WEIGHT: 197.2 LBS | BODY MASS INDEX: 34.93 KG/M2

## 2024-01-15 DIAGNOSIS — I10 HYPERTENSION, UNSPECIFIED TYPE: ICD-10-CM

## 2024-01-15 DIAGNOSIS — M47.26 OSTEOARTHRITIS OF SPINE WITH RADICULOPATHY, LUMBAR REGION: ICD-10-CM

## 2024-01-15 DIAGNOSIS — R06.2 WHEEZING: ICD-10-CM

## 2024-01-15 DIAGNOSIS — E78.5 HYPERLIPIDEMIA, UNSPECIFIED HYPERLIPIDEMIA TYPE: ICD-10-CM

## 2024-01-15 DIAGNOSIS — R73.09 ELEVATED GLUCOSE: Primary | ICD-10-CM

## 2024-01-15 LAB — POC FINGERSTICK BLOOD GLUCOSE: 421 MG/DL (ref 70–100)

## 2024-01-15 PROCEDURE — 3078F DIAST BP <80 MM HG: CPT | Performed by: FAMILY MEDICINE

## 2024-01-15 PROCEDURE — 99214 OFFICE O/P EST MOD 30 MIN: CPT | Performed by: FAMILY MEDICINE

## 2024-01-15 PROCEDURE — 82962 GLUCOSE BLOOD TEST: CPT | Performed by: FAMILY MEDICINE

## 2024-01-15 PROCEDURE — 3074F SYST BP LT 130 MM HG: CPT | Performed by: FAMILY MEDICINE

## 2024-01-15 RX ORDER — GLIMEPIRIDE 2 MG/1
2 TABLET ORAL
Qty: 30 TABLET | Refills: 2 | Status: SHIPPED | OUTPATIENT
Start: 2024-01-15 | End: 2024-04-01

## 2024-01-15 RX ORDER — IPRATROPIUM BROMIDE AND ALBUTEROL SULFATE 2.5; .5 MG/3ML; MG/3ML
3 SOLUTION RESPIRATORY (INHALATION) EVERY 6 HOURS PRN
Qty: 180 ML | Refills: 3 | Status: SHIPPED | OUTPATIENT
Start: 2024-01-15 | End: 2024-03-11

## 2024-01-15 RX ORDER — ROSUVASTATIN CALCIUM 10 MG/1
10 TABLET, COATED ORAL DAILY
Qty: 90 TABLET | Refills: 1 | Status: SHIPPED | OUTPATIENT
Start: 2024-01-15

## 2024-01-18 ENCOUNTER — PATIENT OUTREACH (OUTPATIENT)
Dept: PRIMARY CARE | Facility: CLINIC | Age: 54
End: 2024-01-18
Payer: COMMERCIAL

## 2024-01-18 NOTE — PROGRESS NOTES
"Call regarding appt. with PCP on (1/15/2024) after hospitalization.  At time of outreach call the patient feels as if their condition has (improved) since last visit. Patient states she is aware of upcoming appt with PCP on Monday regarding her BS still being high. States today is last day of steroids and hoping these will start to return to normal but \"we'll see by Monday\".  Reviewed the PCP appointment with the pt and addressed any questions or concerns.   "

## 2024-01-22 ENCOUNTER — TELEPHONE (OUTPATIENT)
Dept: PRIMARY CARE | Facility: CLINIC | Age: 54
End: 2024-01-22

## 2024-01-22 ENCOUNTER — OFFICE VISIT (OUTPATIENT)
Dept: PRIMARY CARE | Facility: CLINIC | Age: 54
End: 2024-01-22
Payer: COMMERCIAL

## 2024-01-22 VITALS
RESPIRATION RATE: 17 BRPM | DIASTOLIC BLOOD PRESSURE: 82 MMHG | OXYGEN SATURATION: 93 % | SYSTOLIC BLOOD PRESSURE: 140 MMHG | HEART RATE: 83 BPM | WEIGHT: 198.4 LBS | HEIGHT: 63 IN | BODY MASS INDEX: 35.15 KG/M2 | TEMPERATURE: 96.8 F

## 2024-01-22 DIAGNOSIS — E11.8 DM TYPE 2, CONTROLLED, WITH COMPLICATION (MULTI): Primary | ICD-10-CM

## 2024-01-22 DIAGNOSIS — E78.5 HYPERLIPIDEMIA, UNSPECIFIED HYPERLIPIDEMIA TYPE: ICD-10-CM

## 2024-01-22 DIAGNOSIS — F41.9 ANXIETY: ICD-10-CM

## 2024-01-22 DIAGNOSIS — I10 HYPERTENSION, UNSPECIFIED TYPE: ICD-10-CM

## 2024-01-22 DIAGNOSIS — R73.09 ELEVATED GLUCOSE: ICD-10-CM

## 2024-01-22 LAB — POC FINGERSTICK BLOOD GLUCOSE: 226 MG/DL (ref 70–100)

## 2024-01-22 PROCEDURE — 4010F ACE/ARB THERAPY RXD/TAKEN: CPT | Performed by: FAMILY MEDICINE

## 2024-01-22 PROCEDURE — 3077F SYST BP >= 140 MM HG: CPT | Performed by: FAMILY MEDICINE

## 2024-01-22 PROCEDURE — 82962 GLUCOSE BLOOD TEST: CPT | Performed by: FAMILY MEDICINE

## 2024-01-22 PROCEDURE — 99213 OFFICE O/P EST LOW 20 MIN: CPT | Performed by: FAMILY MEDICINE

## 2024-01-22 PROCEDURE — 3079F DIAST BP 80-89 MM HG: CPT | Performed by: FAMILY MEDICINE

## 2024-01-22 RX ORDER — LORAZEPAM 0.5 MG/1
0.5 TABLET ORAL 2 TIMES DAILY PRN
Qty: 60 TABLET | Refills: 1 | Status: SHIPPED | OUTPATIENT
Start: 2024-01-22 | End: 2024-02-21

## 2024-01-22 RX ORDER — PIOGLITAZONEHYDROCHLORIDE 30 MG/1
30 TABLET ORAL DAILY
Qty: 30 TABLET | Refills: 3 | Status: SHIPPED | OUTPATIENT
Start: 2024-01-22 | End: 2024-02-20 | Stop reason: ALTCHOICE

## 2024-01-22 RX ORDER — METFORMIN HYDROCHLORIDE 500 MG/1
500 TABLET ORAL
Qty: 30 TABLET | Refills: 3 | Status: SHIPPED | OUTPATIENT
Start: 2024-01-22 | End: 2024-02-06 | Stop reason: SDUPTHER

## 2024-01-22 NOTE — LETTER
January 22, 2024     Patient: Giulia Vaz   YOB: 1970   Date of Visit: 1/22/2024       To Whom It May Concern:    Giulia Vaz was seen in my clinic on 1/22/2024 for an appointment.  Please allow Giulia to due light duty at work for the next 2 weeks. Patient will be reevaluated an her next appointment on 2/6/24.     If you have any questions or concerns, please don't hesitate to call.     Sincerely,         Andrae Avila MD

## 2024-01-22 NOTE — PROGRESS NOTES
"Subjective   Patient ID: Giulia Vaz is a 53 y.o. female who presents for Hyperglycemia.  HPI    Patient presents in office today for elevated glucose. Patient was tapered off her prednisone. Patient was started on glimepiride, has not taken this yet today. She would like to have her sugar checked today. Patient admits to increased dizziness, blurred vision. Patient admits that this did cause her to have an anxiety attack while she was shopping over the weekend. Patient would like a refill on Ativan.     Patient admits that she has been having a really dry throat. Admits that this has been since she has the covid.     Taking current medications which were reviewed.  Problem list discussed.    Eating okay.  Staying active.    Has no other new problem /question.     ROS  Constitutional- No activity change. No appetite change.  Eyes- Denies vision changes.  Respiratory- No shortness of breath.  Cardiovascular- No palpitations. No chest pain.  GI- No nausea or vomiting. No diarrhea or constipation. Denies abdominal pain.  Musculoskeletal- Denies joint swelling.  Extremities- No edema.  Neurological- Denies headaches. Denies dizziness.  Skin- No rashes.  Psychiatric/Behavioral- Denies significant anxiety, or depressed mood.     Objective     /82   Pulse 83   Temp 36 °C (96.8 °F) (Temporal)   Resp 17   Ht 1.588 m (5' 2.5\")   Wt 90 kg (198 lb 6.4 oz)   SpO2 93%   BMI 35.71 kg/m²     Allergies   Allergen Reactions    Ampicillin Hives    Amoxicillin-Pot Clavulanate Rash    Cefuroxime Axetil Rash    Ciprofloxacin Rash    Sulfur Rash       Constitutional-- Well-nourished.  No distress  Head- unremarkable.  Eyes- PERRL.  Conjunctiva normal.  Nose- Normal.  No rhinorrhea noted.  Throat- Oropharynx is clear and moist.  Neck- Supple with no thyromegaly.  No significant cervical adenopathy noted.  Pulmonary/Chest- Breath sounds normal with normal effort.  No wheezing.  Heart- Regular rate and rhythm.  No " murmur.  Abdomen- Soft and non-tender.  No masses noted.  Musculoskeletal- Normal ROM.  No significant joint swelling  Extremities- No edema.   Neurological- Alert.  No noted deficits.  Skin- Warm.   Psychiatric/Behavioral- Mood and affect normal.      Assessment/Plan   1. DM type 2, controlled, with complication (CMS/HCC)  metFORMIN (Glucophage) 500 mg tablet    pioglitazone (Actos) 30 mg tablet      2. Elevated glucose  POCT Fingerstick Glucose manually resulted    metFORMIN (Glucophage) 500 mg tablet    pioglitazone (Actos) 30 mg tablet      3. Hypertension, unspecified type        4. Anxiety  LORazepam (Ativan) 0.5 mg tablet      5. Hyperlipidemia, unspecified hyperlipidemia type               Long talk. Treatment options reviewed.    Reviewed most recent lab work with patient. Advised patient to remain up to date on routine maintenance and health screening.      Diabetes Mellitus controlled.  Blood sugar is down to 226 this morning fasting.  Continue to monitor glucose levels. Educated on diabetes, low-calorie diet and exercise. Recommended eye exam once a year. Educated on diabetic foot care.  Will decrease glimepiride to 1 mg daily.  Start metformin and pioglitazone.    Hypertension controlled.    Continue and take your medications as prescribed.    Health Maintenance issues discussed.    Importance of healthy diet and regular exercise regimen discussed.    See her back in 2 weeks fasting  Follow-up as instructed or sooner if any problems or symptoms do not resolve as expected.      Scribe Attestation  By signing my name below, Clementine TRAVIS Scribe   attest that this documentation has been prepared under the direction and in the presence of Andrae Avila MD.

## 2024-02-02 ENCOUNTER — LAB (OUTPATIENT)
Dept: LAB | Facility: LAB | Age: 54
End: 2024-02-02
Payer: COMMERCIAL

## 2024-02-02 DIAGNOSIS — E78.5 HYPERLIPIDEMIA, UNSPECIFIED HYPERLIPIDEMIA TYPE: ICD-10-CM

## 2024-02-02 DIAGNOSIS — R73.9 ELEVATED BLOOD SUGAR: ICD-10-CM

## 2024-02-02 LAB
CHOLEST SERPL-MCNC: 306 MG/DL (ref 0–199)
CHOLESTEROL/HDL RATIO: 8.4
EST. AVERAGE GLUCOSE BLD GHB EST-MCNC: 229 MG/DL
HBA1C MFR BLD: 9.6 %
HDLC SERPL-MCNC: 36.6 MG/DL
LDLC SERPL CALC-MCNC: ABNORMAL MG/DL
NON HDL CHOLESTEROL: 269 MG/DL (ref 0–149)
TRIGL SERPL-MCNC: 609 MG/DL (ref 0–149)
VLDL: ABNORMAL

## 2024-02-02 PROCEDURE — 83036 HEMOGLOBIN GLYCOSYLATED A1C: CPT

## 2024-02-02 PROCEDURE — 36415 COLL VENOUS BLD VENIPUNCTURE: CPT

## 2024-02-02 PROCEDURE — 80061 LIPID PANEL: CPT

## 2024-02-02 NOTE — PROGRESS NOTES
"Subjective   Patient ID: Giulia Vaz is a 53 y.o. female who presents for Diabetes.    HPI    Patient presents today for DM. She was started on Metformin and Pioglitazone at last office. Glimepiride was decreased to 1 mg daily. She has  been trying to stick to a low sugar diet. Does not check sugars at home. A1C performed on 2/2/24 was 9.6. Patient has not started pioglitazone. Patient admits that she has been taking 2 mg of Glimepiride. Would like to talk about getting the freestyle mary for her arm. Patient in office glucose finger stick was 211.     Would like to have bilateral ears looked at today to make sure there is no infection.     Taking current medications which were reviewed.  Problem list discussed.    Overall doing well.  Eating okay.  Staying active.    Has no other new problem /question.       ROS  Constitutional- No activity change. No appetite change.  Eyes- Denies vision changes.  Respiratory- No shortness of breath.  Cardiovascular- No palpitations. No chest pain.  GI- No nausea or vomiting. No diarrhea or constipation. Denies abdominal pain.  Musculoskeletal- Denies joint swelling.  Extremities- No edema.  Neurological- Denies headaches. Denies dizziness.  Skin- No rashes.  Psychiatric/Behavioral- Denies significant anxiety, or depressed mood.     Objective     /76   Pulse 72   Resp 18   Ht 1.588 m (5' 2.5\")   Wt 91.2 kg (201 lb)   SpO2 94%   BMI 36.18 kg/m²     Allergies   Allergen Reactions    Ampicillin Hives    Amoxicillin-Pot Clavulanate Rash    Cefuroxime Axetil Rash    Ciprofloxacin Rash    Sulfur Rash       Constitutional-- Well-nourished.  No distress  Head- unremarkable.  Eyes- PERRL.  Conjunctiva normal.  Nose- Normal.  No rhinorrhea noted.  Throat- Oropharynx is clear and moist.  Neck- Supple with no thyromegaly.  No significant cervical adenopathy noted.  Pulmonary/Chest- Breath sounds normal with normal effort.  No wheezing.  Heart- Regular rate and rhythm.  No " murmur.  Abdomen- Soft and non-tender.  No masses noted.  Musculoskeletal- Normal ROM.  No significant joint swelling  Extremities- No edema.   Neurological- Alert.  No noted deficits.    Assessment/Plan   1. DM type 2, controlled, with complication (CMS/[facility])  POCT Fingerstick Glucose manually resulted    metFORMIN (Glucophage) 500 mg tablet    empagliflozin (Jardiance) 10 mg    FreeStyle Rupesh 2 Sensor kit      2. Hyperlipidemia, unspecified hyperlipidemia type        3. Hypertension, unspecified type        4. Anxiety        5. Osteoarthritis of spine with radiculopathy, lumbar region        6. Elevated glucose  metFORMIN (Glucophage) 500 mg tablet             Long talk. Treatment options reviewed.    Patient never took pioglitazone.  Will discontinue pioglitazone.  Start Jardiance 10 mg daily.  Metformin 500 mg daily.  Continue glimepiride.  Will arrange for freestyle rupesh as I believe this will make a bigger difference when it comes to monitoring her sugars and diabetes control.  She is worried about having too low of a blood sugar especially at night    Hypertension controlled.     Counseled the patient on anxiety.     Osteoarthritis controlled. Educated the patient on osteoarthritis care and management. Educated on muscle strength and exercise.    Continue and take your medications as prescribed.    Health Maintenance issues discussed.    Importance of healthy diet and regular exercise regimen discussed.    We will contact you with any test results ordered. If you do not hear from us, please contact.    Follow-up as instructed or sooner if any problems or symptoms do not resolve as expected.          Scribe Attestation  By signing my name below, IClementine Scribe   attest that this documentation has been prepared under the direction and in the presence of Andrae Avila MD.

## 2024-02-05 ENCOUNTER — TELEPHONE (OUTPATIENT)
Dept: PRIMARY CARE | Facility: CLINIC | Age: 54
End: 2024-02-05
Payer: COMMERCIAL

## 2024-02-05 NOTE — TELEPHONE ENCOUNTER
----- Message from Andrae Avila MD sent at 2/3/2024  8:20 AM EST -----  Labs confirm diabetes as well as continued very high cholesterol.  Clarify if she is taking her Crestor daily if not she should.  We also need a fasting morning blood sugar level.  I do not think she has a glucometer to do it at home.  Have her come in for a fasting nurse blood sugar check if she does not have a machine.  That is a fasting blood sugar check NOT an A1c.  Have her not leave until we talk to her.  Thanks

## 2024-02-05 NOTE — TELEPHONE ENCOUNTER
Patient called back and is aware. She states she did not take that medication because they told not to at the hospital because of another medication, a sugar pill (not sure of the name) that she had been taking at the hospital. She wanted to wait until it got out of her system  She will talk to you about this more tomorrow at her appointment that she has with you

## 2024-02-06 ENCOUNTER — OFFICE VISIT (OUTPATIENT)
Dept: PRIMARY CARE | Facility: CLINIC | Age: 54
End: 2024-02-06
Payer: COMMERCIAL

## 2024-02-06 VITALS
OXYGEN SATURATION: 94 % | BODY MASS INDEX: 35.61 KG/M2 | HEIGHT: 63 IN | HEART RATE: 72 BPM | RESPIRATION RATE: 18 BRPM | DIASTOLIC BLOOD PRESSURE: 76 MMHG | SYSTOLIC BLOOD PRESSURE: 120 MMHG | WEIGHT: 201 LBS

## 2024-02-06 DIAGNOSIS — R73.09 ELEVATED GLUCOSE: ICD-10-CM

## 2024-02-06 DIAGNOSIS — M47.26 OSTEOARTHRITIS OF SPINE WITH RADICULOPATHY, LUMBAR REGION: ICD-10-CM

## 2024-02-06 DIAGNOSIS — I10 HYPERTENSION, UNSPECIFIED TYPE: ICD-10-CM

## 2024-02-06 DIAGNOSIS — E11.8 DM TYPE 2, CONTROLLED, WITH COMPLICATION (MULTI): Primary | ICD-10-CM

## 2024-02-06 DIAGNOSIS — E11.8 DM TYPE 2, CONTROLLED, WITH COMPLICATION (MULTI): ICD-10-CM

## 2024-02-06 DIAGNOSIS — E78.5 HYPERLIPIDEMIA, UNSPECIFIED HYPERLIPIDEMIA TYPE: ICD-10-CM

## 2024-02-06 DIAGNOSIS — F41.9 ANXIETY: ICD-10-CM

## 2024-02-06 LAB — POC FINGERSTICK BLOOD GLUCOSE: 211 MG/DL (ref 70–100)

## 2024-02-06 PROCEDURE — 4010F ACE/ARB THERAPY RXD/TAKEN: CPT | Performed by: FAMILY MEDICINE

## 2024-02-06 PROCEDURE — 3046F HEMOGLOBIN A1C LEVEL >9.0%: CPT | Performed by: FAMILY MEDICINE

## 2024-02-06 PROCEDURE — 3078F DIAST BP <80 MM HG: CPT | Performed by: FAMILY MEDICINE

## 2024-02-06 PROCEDURE — 3074F SYST BP LT 130 MM HG: CPT | Performed by: FAMILY MEDICINE

## 2024-02-06 PROCEDURE — 99213 OFFICE O/P EST LOW 20 MIN: CPT | Performed by: FAMILY MEDICINE

## 2024-02-06 PROCEDURE — 82962 GLUCOSE BLOOD TEST: CPT | Performed by: FAMILY MEDICINE

## 2024-02-06 RX ORDER — BLOOD-GLUCOSE SENSOR
EACH MISCELLANEOUS
Qty: 2 EACH | Refills: 3 | Status: SHIPPED | OUTPATIENT
Start: 2024-02-06 | End: 2024-05-08 | Stop reason: SDUPTHER

## 2024-02-06 RX ORDER — FLASH GLUCOSE SENSOR
KIT MISCELLANEOUS
Qty: 1 EACH | Refills: 0 | Status: SHIPPED | OUTPATIENT
Start: 2024-02-06 | End: 2024-05-08 | Stop reason: WASHOUT

## 2024-02-06 RX ORDER — METFORMIN HYDROCHLORIDE 500 MG/1
500 TABLET ORAL
Qty: 30 TABLET | Refills: 3 | Status: SHIPPED | OUTPATIENT
Start: 2024-02-06 | End: 2024-02-20 | Stop reason: ALTCHOICE

## 2024-02-06 RX ORDER — BLOOD-GLUCOSE,RECEIVER,CONT
EACH MISCELLANEOUS
Qty: 1 EACH | Refills: 0 | Status: SHIPPED | OUTPATIENT
Start: 2024-02-06

## 2024-02-06 NOTE — TELEPHONE ENCOUNTER
Emelyn from [facility] in [place] is calling in regards to the rx that you sent over today for Freestyle Rupesh Sensor kit.  She states you would need to send over a RX for a sensor and reader, they don't sell the sensor kit.   Ok to send rxs for FreeStyle Rupesh sensor and reader?  Please send to [facility] [place]      Patient's # [phone]    Preferred pharmacy [facility]

## 2024-02-12 PROBLEM — F41.9 ANXIETY: Status: ACTIVE | Noted: 2024-02-12

## 2024-02-12 PROBLEM — E11.8 DM TYPE 2, CONTROLLED, WITH COMPLICATION (MULTI): Status: ACTIVE | Noted: 2024-02-12

## 2024-02-12 NOTE — PROGRESS NOTES
"Subjective   Patient ID: Giulia Vaz is a 53 y.o. female who presents for Diabetes, Anxiety, and Bronchitis.  HPI  Patient presents today for a follow-up on Diabetes. On 2-6-24 was seen, and Pioglitazone was discontinued. Jardiance 10 MG daily, and Metformin 500 MG daily was started. States she is not on Metformin. She is on Amaryl. States her average high sugar reading was 178.  Her insurance will not cover Freestyle Rupesh 2 CGM due to her not being on insulin. She would like to discuss possible options.  Fasting glucose in office: 134    Also presents today for a follow-up on Anxiety. Is taking Ativan 0.5 MG. No issues with the medication. Reports that the medication gives 70% anxiety control/relief. [place] today. Controlled substance contract signed today. Last took medication 4 days ago.    Also complaining of a sore throat, cough, sinus congestion x 5 days. Went to [facility] in [place] on 2-14-24. Had a strep test, COVID, and influenza test done. Was not prescribed any medications.        Taking current medications which were reviewed.  Problem list discussed.    Overall doing well.  Eating okay.  Staying active.    Has no other new problem /question.    ROS  Constitutional- No activity change. No appetite change.  Eyes- Denies vision changes.  Respiratory- No shortness of breath.  Cardiovascular- No palpitations. No chest pain.  GI- No nausea or vomiting. No diarrhea or constipation. Denies abdominal pain.  Musculoskeletal- Denies joint swelling.  Neurological- Denies headaches. Denies dizziness.  Psychiatric/Behavioral- Denies significant anxiety, or depressed mood.     Objective     /66   Pulse 78   Temp 36.6 °C (97.9 °F)   Resp 16   Ht 1.588 m (5' 2.5\")   Wt 90.8 kg (200 lb 3.2 oz)   SpO2 95%   BMI 36.03 kg/m²     Allergies   Allergen Reactions    Ampicillin Hives    Amoxicillin-Pot Clavulanate Rash    Cefuroxime Axetil Rash    Ciprofloxacin Rash    Sulfur Rash "       Constitutional-- Well-nourished.  No distress  Eyes- PERRL.  Conjunctiva normal.  Nose- Normal.  No rhinorrhea noted.  Throat- Oropharynx is clear and moist.  Neck- Supple with no thyromegaly.  No significant cervical adenopathy noted.  Pulmonary/Chest- Breath sounds normal with normal effort.  No wheezing.  Heart- Regular rate and rhythm.  No murmur.  Abdomen- Soft and non-tender.  No masses noted.  Musculoskeletal- Normal ROM.  No significant joint swelling  Extremities- No edema.   Neurological- Alert.  No noted deficits.  Skin- Warm.    Psychiatric/Behavioral- Mood and affect normal.    Assessment/Plan   1. DM type 2, controlled, with complication (CMS/[facility])  Albumin, urine, random    POCT Fingerstick Glucose manually resulted    Albumin, urine, random    pioglitazone (Actos) 15 mg tablet      2. Hypertension, unspecified type        3. Hyperlipidemia, unspecified hyperlipidemia type        4. Visit for screening mammogram  BI mammo bilateral screening tomosynthesis      5. Anxiety  Opiate/Opioid/Benzo Prescription Compliance    Opiate/Opioid/Benzo Prescription Compliance    OOB Internal Tracking      6. Therapeutic drug monitoring  Opiate/Opioid/Benzo Prescription Compliance    Opiate/Opioid/Benzo Prescription Compliance    OOB Internal Tracking      7. Bronchitis  azithromycin (Zithromax) 250 mg tablet      8. BMI 36.0-36.9,adult        9. Class 2 severe obesity due to excess calories with serious comorbidity and body mass index (BMI) of 36.0 to 36.9 in adult (CMS/[facility])               Long talk. Treatment options reviewed.    Diabetes Mellitus controlled. Continue to monitor glucose levels. Educated on diabetes, low-calorie diet and exercise. Recommended eye exam once a year. Educated on diabetic foot care.  Take medication every other day.  Will continue to monitor.  Take Pioglitazone as discussed every other day, Mondays, Wednesdays, Fridays, and Saturdays.   Follow up via phone in 1-2 weeks to report  glucose levels.      Discussed bronchitis.  Take Zithromax as discussed.     Continue and take your medications as prescribed.    Health Maintenance issues discussed.    Importance of healthy diet and regular exercise regimen discussed.    We will contact you with any test results ordered. If you do not hear from us, please contact.    Follow-up as instructed or sooner if any problems or symptoms do not resolve as expected.        Scribe Attestation  By signing my name below, IClementine Scribe   attest that this documentation has been prepared under the direction and in the presence of Andrae Avila MD.

## 2024-02-19 ENCOUNTER — TELEPHONE (OUTPATIENT)
Dept: PRIMARY CARE | Facility: CLINIC | Age: 54
End: 2024-02-19
Payer: COMMERCIAL

## 2024-02-19 NOTE — TELEPHONE ENCOUNTER
DID A PRIOR AUTHORIZATION FOR FREESTYLE MIROSLAVA 2 SENSOR KIT - RECEIVED A FAX BACK FROM [employer] - THEY HAVE DENIED THIS REQUEST FOR THE FOLLOWING REASON:    COVERAGE IS PROVIDED IF THE PATIENT IS USING AN INSULIN REGIMEN.  THIS INCLUDES PATIENTS ON A BASAL INSULIN REGIMEN, BASAL AND PRANDIAL INSULIN REGIMEN, PRANDIAL INSULIN REGIMEN OR CONTINUOUS SUBCUTANEOUS INSULIN INFUSION (INSULIN PUMP). COVERAGE CANNOT BE AUTHORIZED AT THIS TIME.    PLEASE ADVISE.

## 2024-02-19 NOTE — TELEPHONE ENCOUNTER
Patient is aware. Has appointment tomorrow.    Andrae Avila MD  [id number] [place] [id number] Clinical Support Staff1 minute ago (4:59 PM)       Please give her a call and let her know that her insurance will not cover the freestyle mary continuous glucose monitor.  They require that she be on insulin.  There is a low-dose once a day insulin that we could start which would really be helpful.  I think starting that would allow her to get the monitor.  If she agrees please have her come see me to go over all of this in the office and get her started.

## 2024-02-20 ENCOUNTER — APPOINTMENT (OUTPATIENT)
Dept: PRIMARY CARE | Facility: CLINIC | Age: 54
End: 2024-02-20
Payer: COMMERCIAL

## 2024-02-20 ENCOUNTER — OFFICE VISIT (OUTPATIENT)
Dept: PRIMARY CARE | Facility: CLINIC | Age: 54
End: 2024-02-20
Payer: COMMERCIAL

## 2024-02-20 VITALS
DIASTOLIC BLOOD PRESSURE: 66 MMHG | OXYGEN SATURATION: 95 % | WEIGHT: 200.2 LBS | RESPIRATION RATE: 16 BRPM | HEIGHT: 63 IN | HEART RATE: 78 BPM | TEMPERATURE: 97.9 F | SYSTOLIC BLOOD PRESSURE: 104 MMHG | BODY MASS INDEX: 35.47 KG/M2

## 2024-02-20 DIAGNOSIS — J40 BRONCHITIS: ICD-10-CM

## 2024-02-20 DIAGNOSIS — I10 HYPERTENSION, UNSPECIFIED TYPE: ICD-10-CM

## 2024-02-20 DIAGNOSIS — Z51.81 THERAPEUTIC DRUG MONITORING: ICD-10-CM

## 2024-02-20 DIAGNOSIS — E78.5 HYPERLIPIDEMIA, UNSPECIFIED HYPERLIPIDEMIA TYPE: ICD-10-CM

## 2024-02-20 DIAGNOSIS — F41.9 ANXIETY: ICD-10-CM

## 2024-02-20 DIAGNOSIS — Z12.31 VISIT FOR SCREENING MAMMOGRAM: ICD-10-CM

## 2024-02-20 DIAGNOSIS — E11.8 DM TYPE 2, CONTROLLED, WITH COMPLICATION (MULTI): Primary | ICD-10-CM

## 2024-02-20 DIAGNOSIS — E66.01 CLASS 2 SEVERE OBESITY DUE TO EXCESS CALORIES WITH SERIOUS COMORBIDITY AND BODY MASS INDEX (BMI) OF 36.0 TO 36.9 IN ADULT (MULTI): ICD-10-CM

## 2024-02-20 LAB — POC FINGERSTICK BLOOD GLUCOSE: 134 MG/DL (ref 70–100)

## 2024-02-20 PROCEDURE — 80373 DRUG SCREENING TRAMADOL: CPT

## 2024-02-20 PROCEDURE — 3046F HEMOGLOBIN A1C LEVEL >9.0%: CPT | Performed by: FAMILY MEDICINE

## 2024-02-20 PROCEDURE — 80354 DRUG SCREENING FENTANYL: CPT

## 2024-02-20 PROCEDURE — 80358 DRUG SCREENING METHADONE: CPT

## 2024-02-20 PROCEDURE — 3074F SYST BP LT 130 MM HG: CPT | Performed by: FAMILY MEDICINE

## 2024-02-20 PROCEDURE — 99213 OFFICE O/P EST LOW 20 MIN: CPT | Performed by: FAMILY MEDICINE

## 2024-02-20 PROCEDURE — 80365 DRUG SCREENING OXYCODONE: CPT

## 2024-02-20 PROCEDURE — 3008F BODY MASS INDEX DOCD: CPT | Performed by: FAMILY MEDICINE

## 2024-02-20 PROCEDURE — 80307 DRUG TEST PRSMV CHEM ANLYZR: CPT

## 2024-02-20 PROCEDURE — 80346 BENZODIAZEPINES1-12: CPT

## 2024-02-20 PROCEDURE — 80349 CANNABINOIDS NATURAL: CPT

## 2024-02-20 PROCEDURE — 3078F DIAST BP <80 MM HG: CPT | Performed by: FAMILY MEDICINE

## 2024-02-20 PROCEDURE — 82570 ASSAY OF URINE CREATININE: CPT

## 2024-02-20 PROCEDURE — 80368 SEDATIVE HYPNOTICS: CPT

## 2024-02-20 PROCEDURE — 82043 UR ALBUMIN QUANTITATIVE: CPT

## 2024-02-20 PROCEDURE — 80361 OPIATES 1 OR MORE: CPT

## 2024-02-20 PROCEDURE — 82962 GLUCOSE BLOOD TEST: CPT | Performed by: FAMILY MEDICINE

## 2024-02-20 RX ORDER — AZITHROMYCIN 250 MG/1
TABLET, FILM COATED ORAL
Qty: 6 TABLET | Refills: 0 | Status: SHIPPED | OUTPATIENT
Start: 2024-02-20 | End: 2024-02-24

## 2024-02-20 RX ORDER — PIOGLITAZONEHYDROCHLORIDE 15 MG/1
15 TABLET ORAL DAILY
Qty: 30 TABLET | Refills: 3 | Status: SHIPPED | OUTPATIENT
Start: 2024-02-20 | End: 2024-05-08 | Stop reason: WASHOUT

## 2024-02-20 ASSESSMENT — PATIENT HEALTH QUESTIONNAIRE - PHQ9
2. FEELING DOWN, DEPRESSED OR HOPELESS: NOT AT ALL
SUM OF ALL RESPONSES TO PHQ9 QUESTIONS 1 AND 2: 0
1. LITTLE INTEREST OR PLEASURE IN DOING THINGS: NOT AT ALL

## 2024-02-21 LAB
AMPHETAMINES UR QL SCN: ABNORMAL
BARBITURATES UR QL SCN: ABNORMAL
BZE UR QL SCN: ABNORMAL
CANNABINOIDS UR QL SCN: ABNORMAL
CREAT UR-MCNC: 34.6 MG/DL (ref 20–320)
CREAT UR-MCNC: 35.6 MG/DL (ref 20–320)
MICROALBUMIN UR-MCNC: 34.1 MG/L
MICROALBUMIN/CREAT UR: 95.8 UG/MG CREAT
PCP UR QL SCN: ABNORMAL

## 2024-02-24 LAB — CARBOXYTHC UR-MCNC: >500 NG/ML

## 2024-02-28 DIAGNOSIS — I10 HYPERTENSION, UNSPECIFIED TYPE: ICD-10-CM

## 2024-02-28 LAB
1OH-MIDAZOLAM UR CFM-MCNC: <25 NG/ML
6MAM UR CFM-MCNC: <25 NG/ML
7AMINOCLONAZEPAM UR CFM-MCNC: <25 NG/ML
A-OH ALPRAZ UR CFM-MCNC: <25 NG/ML
ALPRAZ UR CFM-MCNC: <25 NG/ML
CHLORDIAZEP UR CFM-MCNC: <25 NG/ML
CLONAZEPAM UR CFM-MCNC: <25 NG/ML
CODEINE UR CFM-MCNC: <50 NG/ML
DIAZEPAM UR CFM-MCNC: <25 NG/ML
EDDP UR CFM-MCNC: <25 NG/ML
FENTANYL UR CFM-MCNC: <2.5 NG/ML
HYDROCODONE CTO UR CFM-MCNC: <25 NG/ML
HYDROMORPHONE UR CFM-MCNC: <25 NG/ML
LORAZEPAM UR CFM-MCNC: 25 NG/ML
METHADONE UR CFM-MCNC: <25 NG/ML
MIDAZOLAM UR CFM-MCNC: <25 NG/ML
MORPHINE UR CFM-MCNC: <50 NG/ML
NORDIAZEPAM UR CFM-MCNC: <25 NG/ML
NORFENTANYL UR CFM-MCNC: <2.5 NG/ML
NORHYDROCODONE UR CFM-MCNC: <25 NG/ML
NOROXYCODONE UR CFM-MCNC: <25 NG/ML
NORTRAMADOL UR-MCNC: <50 NG/ML
OXAZEPAM UR CFM-MCNC: <25 NG/ML
OXYCODONE UR CFM-MCNC: <25 NG/ML
OXYMORPHONE UR CFM-MCNC: <25 NG/ML
TEMAZEPAM UR CFM-MCNC: <25 NG/ML
TRAMADOL UR CFM-MCNC: <50 NG/ML
ZOLPIDEM UR CFM-MCNC: <25 NG/ML
ZOLPIDEM UR-MCNC: <25 NG/ML

## 2024-02-28 RX ORDER — IRBESARTAN AND HYDROCHLOROTHIAZIDE 300; 12.5 MG/1; MG/1
1 TABLET, FILM COATED ORAL DAILY
Qty: 120 TABLET | Refills: 1 | Status: SHIPPED | OUTPATIENT
Start: 2024-02-28

## 2024-02-29 ENCOUNTER — TELEPHONE (OUTPATIENT)
Dept: PRIMARY CARE | Facility: CLINIC | Age: 54
End: 2024-02-29
Payer: COMMERCIAL

## 2024-02-29 NOTE — TELEPHONE ENCOUNTER
----- Message from Andrae Avila MD sent at 2/29/2024  7:18 AM EST -----  Urine drug screen shows the medication she is taking but is also very positive for THC/marijuana.  We cannot prescribe lorazepam if she continues to use marijuana.  If she prefers to use the marijuana we can make a psychiatry referral to see what they would do.  If she agrees to stop marijuana then arrange for repeat urine drug screen about 6 weeks

## 2024-03-06 ENCOUNTER — PATIENT OUTREACH (OUTPATIENT)
Dept: PRIMARY CARE | Facility: CLINIC | Age: 54
End: 2024-03-06
Payer: COMMERCIAL

## 2024-03-09 DIAGNOSIS — R06.2 WHEEZING: ICD-10-CM

## 2024-03-11 RX ORDER — IPRATROPIUM BROMIDE AND ALBUTEROL SULFATE 2.5; .5 MG/3ML; MG/3ML
SOLUTION RESPIRATORY (INHALATION)
Qty: 180 ML | Refills: 3 | Status: SHIPPED | OUTPATIENT
Start: 2024-03-11

## 2024-03-18 DIAGNOSIS — E11.8 DM TYPE 2, CONTROLLED, WITH COMPLICATION (MULTI): ICD-10-CM

## 2024-03-18 NOTE — TELEPHONE ENCOUNTER
PATIENTS NURSE  IS CALLING TO REQUEST A PRESCRIPTION FOR A GLUCOSE METER LANCETS AND TEST STRIPS TO ANY TYPE OF METER SENT TO HER PHARMACY.   [facility]OOT

## 2024-03-19 RX ORDER — LANCETS
EACH MISCELLANEOUS
Qty: 100 EACH | Refills: 3 | Status: SHIPPED | OUTPATIENT
Start: 2024-03-19

## 2024-03-19 RX ORDER — IBUPROFEN 200 MG
CAPSULE ORAL
Qty: 100 STRIP | Refills: 3 | Status: SHIPPED | OUTPATIENT
Start: 2024-03-19

## 2024-03-19 RX ORDER — BLOOD-GLUCOSE METER
KIT MISCELLANEOUS
Qty: 1 EACH | Refills: 0 | Status: SHIPPED | OUTPATIENT
Start: 2024-03-19

## 2024-03-26 DIAGNOSIS — E11.8 DM TYPE 2, CONTROLLED, WITH COMPLICATION (MULTI): ICD-10-CM

## 2024-03-26 NOTE — TELEPHONE ENCOUNTER
PATIENT IS CALLING TO ASK ABOUT ALL DIABETES MEDICATIONS.  HER GLUCOSE READINGS IN THE MORNING ARE STILL AROUND 150 -  SHE SAID IT DOES GO DOWN SOME WHEN SHE STARTS MOVING AROUND BUT SHE SAID DO YOU WANT HER TO CHANGE ANYTHING WITH THE MEDICATIONS?      PLEASE ADVISE

## 2024-03-26 NOTE — TELEPHONE ENCOUNTER
LMOM for patient to call back. RX pending if agreeable.    Andrae Avila MD  [id number] [place] [id number] Clinical Support Staff2 minutes ago (4:16 PM)       Please let her know I would increase her Jardiance to 25 mg daily.  If she agrees please arrange with 30 pills and 3 refills.  Follow-up with me per routine in about 2 to 3 months.  Please let her know and arrange

## 2024-03-30 DIAGNOSIS — R73.09 ELEVATED GLUCOSE: ICD-10-CM

## 2024-04-01 RX ORDER — GLIMEPIRIDE 2 MG/1
TABLET ORAL
Qty: 30 TABLET | Refills: 2 | Status: SHIPPED | OUTPATIENT
Start: 2024-04-01

## 2024-04-10 ENCOUNTER — PATIENT OUTREACH (OUTPATIENT)
Dept: PRIMARY CARE | Facility: CLINIC | Age: 54
End: 2024-04-10
Payer: COMMERCIAL

## 2024-04-10 NOTE — PROGRESS NOTES
Final call back to assess needs post discharge. Left voicemail for patient. Contact information provided.    Patient has met target of no readmission for (90) days post (hospital) discharge and is graduated from Transitional Care Management program at this time.

## 2024-04-15 DIAGNOSIS — E11.8 DM TYPE 2, CONTROLLED, WITH COMPLICATION (MULTI): ICD-10-CM

## 2024-04-15 NOTE — TELEPHONE ENCOUNTER
Patient is calling to give you an update on her sugars. She states they are doing well. Perfect. She never had to take the Jardiance 25 mg. She's not taking the Pioglitazone 15 mg. She will just need to go back on the Jardiance 10 mg (in about 5 days)    Rx Refill Request Telephone Encounter    Name:  Giulia Vaz  : 1970     Medication Name:  Jardiane   Dose (Optional):    10 mg  Quantity (Optional):    #30  Directions (Optional):   Take 1 tablet (10 mg) by mouth once daily     ALLERGIES:   see list     Specific Pharmacy location:  [facility]    Date of last appointment:  24  Date of next appointment:  24    Best number to reach patient:  [phone]

## 2024-04-22 ENCOUNTER — TELEPHONE (OUTPATIENT)
Dept: PRIMARY CARE | Facility: CLINIC | Age: 54
End: 2024-04-22
Payer: COMMERCIAL

## 2024-04-22 NOTE — TELEPHONE ENCOUNTER
Spoke with Rashid at [facility] [employer]. He states she has a high copay, $585, she now has [place] insurance, a prior auth isn't even an option. Suggested that she speak with her insurance    Called patient and she is aware. She already spoke with her insurance and they suggested she use a copay card and it only brought it down to $500     She is aware Dr Avila wants her to speak with our pharmacist, Me[place]. Aware you will contact you  Thanks

## 2024-04-22 NOTE — TELEPHONE ENCOUNTER
Patient is calling because when she went to  her Jardiance 10 mg, it was $600. She cannot afford that. It used to be $30. She's not sure if she got 2 rxs in one month (because she got her Metformin and Glimperide). She states the pharmacist said that there was nothing they could do. Brianne does not have a prior auth. Please advise as to what she should do  Thanks      Patient's # [phone]    Preferred pharmacy [facility]

## 2024-05-03 DIAGNOSIS — E11.8 DM TYPE 2, CONTROLLED, WITH COMPLICATION (MULTI): Primary | ICD-10-CM

## 2024-05-08 ENCOUNTER — TELEMEDICINE (OUTPATIENT)
Dept: PHARMACY | Facility: HOSPITAL | Age: 54
End: 2024-05-08
Payer: COMMERCIAL

## 2024-05-08 DIAGNOSIS — E11.8 DM TYPE 2, CONTROLLED, WITH COMPLICATION (MULTI): ICD-10-CM

## 2024-05-08 DIAGNOSIS — F17.200 TOBACCO DEPENDENCE: Primary | ICD-10-CM

## 2024-05-08 RX ORDER — METFORMIN HYDROCHLORIDE 500 MG/1
500 TABLET ORAL
COMMUNITY
Start: 2024-04-18 | End: 2024-05-10

## 2024-05-08 RX ORDER — FLUTICASONE PROPIONATE 50 MCG
1 SPRAY, SUSPENSION (ML) NASAL DAILY
COMMUNITY

## 2024-05-08 NOTE — ASSESSMENT & PLAN NOTE
Patient's goal A1c is < 7%.  Is pt at goal? No, 9.6% on 24  Patient's SMBGs are at goal.     Rationale for plan: patient reports more lows without using Jardiance. She has been off Jardiance x 1 month d/t cost. She can attempt to use copay card but will likely be exhausted before insurance deductible is met. No changes made during appt. Will wait for patient call back.    Can discuss use of GLP1 for cardiorenal benefit as this may allow faster accumulation toward deductible.    Medication Changes:  CONTINUE:  Glimepiride 2mg daily in the morning  Metformin 500mg daily      Monitoring and Education:  Diabetes Education  Rule of 15: eating ~15 g of carbs when BG less than 80 (half cup juice, 3-4 glucose tabs).  Recognize symptoms of high and low blood sugar.   Eat a realistic healthy diet consisting of fruits, vegetables, fiber, protein food choices on a regular basis and be aware of portion/serving sizes. Reduce carbohydrate consumption and always consume with protein and fat. Avoid foods high in saturated/trans fat, high salt content, and sweets and beverages with added sugars.  Limit alcohol consumption; alcohol may affect your blood sugar and cause hypoglycemia.   Stay active and incorporate ~30 mins of exercise into your daily routine to manage your weight and increase the body's acceptance of insulin.    PATIENT GOALS   Fasting B - 130 mg/dL 2-HR Postprandial BG:   Less than 180 mg/dL A1c:   Less than 7.0 %

## 2024-05-08 NOTE — PROGRESS NOTES
Patient ID: Giulia Vaz is a 54 y.o. female who presents for Diabetes.  Pt is here for First appointment.     PCP/Referring Provider: Andrae Avila MD  Last Visit: 24  Next visit: 24    Verbal consent to manage patient's drug therapy was obtained from patient. They were informed they may decline to participate or withdraw from participation in pharmacy services at any time. Patient is agreeable to detailed voice messages if unable to be reached.       Subjective   Treatment Adherence:   Patient did take medications today.   Number of missed doses in last 7 days: 0.  Why? N/A     Preferred pharmacy: [facility]  Can patient afford prescribed medications: No, Jardiance expensive     Current exercise: not assessed  Current diet:   Breakfast: eggs and toast  Lunch: soup usually or sandwich   Dinner: baked chicken, salad, fajitas, pork chops  Snacks: denies  Drinks: 8 glasses of water, 1 cup of coffee in AM with powdered creamer, denies juice/pop/alcohol        HPI  DIABETES MELLITUS TYPE 2:  Diagnosed with diabetes:  < 1 yr. Known diabetic complications: none.  Last optometry exam: not assessed  Most recent visit in Podiatry: not assessed-- patient denies sores or cuts on feet today      Current diabetic medications include:  Jardiance 10mg daily  Glimepiride 2mg daily in the morning  Metformin 500mg daily    Clarifications to above regimen: has been off Jardiance x 1 month d/t cost    Home blood glucose records (mg/dL)  FB-100  PPBs    Any episodes of hypoglycemia? Yes, pt reports several alarms .  Did patient treat episode of hypoglycemia appropriately? Yes, glucose tablets.  Does pt have proteinuria? Yes If appropriate, is patient on ACEi/ARB? Yes, irbesartan    Secondary Prevention  Statin? Yes  ACE-I/ARB? Yes  Aspirin? No    Pertinent PMH Review:  PMH of Pancreatitis: No  PMH of Retinopathy: No  PMH of Urinary Tract Infections: No  PMH of MTC: No        TOBACCO USE:  Patient currently reports  usin cigarettes/day.     Pt has attempted to quit  2 times in the past.  The most recent attempt was a few months ago and she was successful. Started smoking again after family tragedy.      In the past when attempting to quit, pt utilized: nicotine patches    Patient identifies the following barriers:  stress          Review of Systems    Objective     BP Readings from Last 4 Encounters:   24 104/66   24 120/76   24 140/82   01/15/24 104/60      There were no vitals filed for this visit.     Labs  Creatinine   Date Value Ref Range Status   10/02/2023 0.58 0.50 - 1.05 mg/dL Final     eGFR   Date Value Ref Range Status   10/02/2023 >90 >60 mL/min/1.73m*2 Final     Comment:     Calculations of estimated GFR are performed using the  CKD-EPI Study Refit  equation without the race variable for the IDMS-Traceable Creatinine Methods.    https://jasn.asnjournals.org/content/early//ASN.7815740825     Sodium   Date Value Ref Range Status   10/02/2023 136 136 - 145 mmol/L Final     Potassium   Date Value Ref Range Status   10/02/2023 4.3 3.5 - 5.3 mmol/L Final     Chloride   Date Value Ref Range Status   10/02/2023 98 98 - 107 mmol/L Final     Urea Nitrogen   Date Value Ref Range Status   10/02/2023 12 6 - 23 mg/dL Final     AST   Date Value Ref Range Status   10/02/2023 22 9 - 39 U/L Final        Lab Results   Component Value Date    BILITOT 0.5 10/02/2023    CALCIUM 9.3 10/02/2023    CO2 27 10/02/2023    CL 98 10/02/2023    CREATININE 0.58 10/02/2023    GLUCOSE 155 (H) 10/02/2023    ALKPHOS 71 10/02/2023    K 4.3 10/02/2023    PROT 7.0 10/02/2023     10/02/2023    AST 22 10/02/2023    ALT 35 10/02/2023    BUN 12 10/02/2023    ANIONGAP 15 10/02/2023    ALBUMIN 4.4 10/02/2023     Lab Results   Component Value Date    TRIG 609 (H) 2024    CHOL 306 (H) 2024    LDLCALC  2024      Comment:      The calculation of LDL and VLDL are inaccurate when the Triglycerides are  greater than 400 mg/dL or when the patient is non-fasting. If LDL measurement is necessary contact the testing laboratory for an alternative LDL assay.                                  Near   Borderline      AGE      Desirable  Optimal    High     High     Very High     0-19 Y     0 - 109     ---    110-129   >/= 130     ----    20-24 Y     0 - 119     ---    120-159   >/= 160     ----      >24 Y     0 -  99   100-129  130-159   160-189     >/=190      HDL 36.6 02/02/2024     Lab Results   Component Value Date    HGBA1C 9.6 (H) 02/02/2024       Current Outpatient Medications   Medication Instructions    albuterol 2.5 mg /3 mL (0.083 %) nebulizer solution inhalation, Every 6 hours PRN, USE 1 UNIT DOSE IN NEBULIZER    albuterol 90 mcg/actuation inhaler 2 puffs, inhalation, Every 4 hours PRN    blood sugar diagnostic (Blood Glucose Test) strip Use to test once daily.    butalbital-acetaminophen-caff (Esgic) -40 mg capsule 1-2 capsules, oral, Every 6 hours PRN    empagliflozin (JARDIANCE) 10 mg, oral, Daily    fluticasone (Flonase) 50 mcg/actuation nasal spray 1 spray, Each Nostril, Daily, Shake gently. Before first use, prime pump. After use, clean tip and replace cap.    FreeStyle Rupesh 3 [place] misc Use as instructed    FreeStyle Rupesh 3 Sensor device Apply 1 sensor every 14 days.    FreeStyle Lite Meter kit Use to test once daily.    glimepiride (Amaryl) 2 mg tablet take 1 tablet by mouth once daily every morning before meals    ipratropium-albuteroL (Duo-Neb) 0.5-2.5 mg/3 mL nebulizer solution inhale contents of 1 vial in nebulizer every 6 hours if needed for wheezing    irbesartan-hydrochlorothiazide (Avalide) 300-12.5 mg tablet 1 tablet, oral, Daily    lancets misc Use to test once daily.    LORazepam (ATIVAN) 0.5 mg, oral, 2 times daily PRN, 30 DAY SUPPLY    metFORMIN (GLUCOPHAGE) 500 mg, oral, Daily with breakfast    nebulizer and compressor device Use as directed    nicotine (Nicoderm CQ) 14 mg/24 hr  patch 1 patch, transdermal, Daily RT    nicotine (Nicoderm CQ) 21 mg/24 hr patch 1 patch, transdermal, Daily RT    nicotine (Nicoderm CQ) 7 mg/24 hr patch 1 patch, transdermal, Daily RT    rosuvastatin (CRESTOR) 10 mg, oral, Daily        DRUG INTERACTIONS:  None at time of review    Assessment/Plan   Problem List Items Addressed This Visit             ICD-10-CM    DM type 2, controlled, with complication (Multi) E11.8     Patient's goal A1c is < 7%.  Is pt at goal? No, 9.6% on 24  Patient's SMBGs are at goal.     Rationale for plan: patient reports more lows without using Jardiance. She has been off Jardiance x 1 month d/t cost. She can attempt to use copay card but will likely be exhausted before insurance deductible is met. No changes made during appt. Will wait for patient call back.    Can discuss use of GLP1 for cardiorenal benefit as this may allow faster accumulation toward deductible.    Medication Changes:  CONTINUE:  Glimepiride 2mg daily in the morning  Metformin 500mg daily      Monitoring and Education:  Diabetes Education  Rule of 15: eating ~15 g of carbs when BG less than 80 (half cup juice, 3-4 glucose tabs).  Recognize symptoms of high and low blood sugar.   Eat a realistic healthy diet consisting of fruits, vegetables, fiber, protein food choices on a regular basis and be aware of portion/serving sizes. Reduce carbohydrate consumption and always consume with protein and fat. Avoid foods high in saturated/trans fat, high salt content, and sweets and beverages with added sugars.  Limit alcohol consumption; alcohol may affect your blood sugar and cause hypoglycemia.   Stay active and incorporate ~30 mins of exercise into your daily routine to manage your weight and increase the body's acceptance of insulin.    PATIENT GOALS   Fasting B - 130 mg/dL 2-HR Postprandial BG:   Less than 180 mg/dL A1c:   Less than 7.0 %            Relevant Medications    FreeStyle Rupesh 3 Sensor device     Other  Visit Diagnoses         Codes    Tobacco dependence    -  Primary F17.200    Relevant Medications    nicotine (Nicoderm CQ) 21 mg/24 hr patch    nicotine (Nicoderm CQ) 7 mg/24 hr patch    nicotine (Nicoderm CQ) 14 mg/24 hr patch        Patient requested reorder of nicotine patches.       Labs ordered:  A1c     Referrals:  none     Follow-up: as needed - patient to call back     Patient was provided with PharmD phone number and encouraged to reach out with any questions or concerns Prior to next appointment or ask provider for another pharmacy referral.    Time spent with pt: Total length of time 25 (minutes) of the encounter and more than 50% was spent counseling the patient.      Mere Ludwig PharmD, LETICIA  Clinical Pharmacist  Pharmacy Services  [phone]    Continue all meds under the continuation of care with the referring provider and clinical pharmacy team.    Verbal consent to manage patient's drug therapy was obtained from the patient. They were informed they may decline to participate or withdraw from participation in pharmacy services at any time.

## 2024-05-09 ENCOUNTER — TELEPHONE (OUTPATIENT)
Dept: PHARMACY | Facility: HOSPITAL | Age: 54
End: 2024-05-09
Payer: COMMERCIAL

## 2024-05-09 RX ORDER — NICOTINE 7MG/24HR
1 PATCH, TRANSDERMAL 24 HOURS TRANSDERMAL
Qty: 7 PATCH | Refills: 0 | Status: SHIPPED | OUTPATIENT
Start: 2024-05-09 | End: 2024-05-16

## 2024-05-09 RX ORDER — BLOOD-GLUCOSE SENSOR
EACH MISCELLANEOUS
Qty: 6 EACH | Refills: 3 | Status: SHIPPED | OUTPATIENT
Start: 2024-05-09

## 2024-05-09 RX ORDER — IBUPROFEN 200 MG
1 TABLET ORAL
Qty: 7 PATCH | Refills: 0 | Status: SHIPPED | OUTPATIENT
Start: 2024-05-09 | End: 2024-05-16

## 2024-05-09 NOTE — TELEPHONE ENCOUNTER
Patient called to follow up from yesterday's visit.     Informed patient that new insurance plan has very high out of pocket spend of ~$9,000 before mediations will be covered at 100%.   Discussed use of copay cards. Patient reported that this did not decrease the cost at the pharmacy (still > $500).     Patients SMBGs are at goal, so will likely stay controlled without Jardiance therapy but will be losing cardiorenal benefit.         Encouraged patient to reach out to employer HR/benefit team to discuss this change in plan and to ensure same plan isn't chosen at re-enrollment.       All questions/concerns addressed. Patient aware to call PharmD or inform PCP office for future needs.     Mere Ludwig PharmD, LETICIA  Clinical Pharmacist  [phone]  [email or web address]

## 2024-05-10 DIAGNOSIS — E11.8 DM TYPE 2, CONTROLLED, WITH COMPLICATION (MULTI): ICD-10-CM

## 2024-05-10 RX ORDER — METFORMIN HYDROCHLORIDE 500 MG/1
500 TABLET ORAL
Qty: 30 TABLET | Refills: 1 | Status: SHIPPED | OUTPATIENT
Start: 2024-05-10

## 2024-06-24 NOTE — PROGRESS NOTES
"Subjective   Patient ID: Giulia Vaz is a 54 y.o. female who presents for Diabetes, Hypertension, and Hyperlipidemia.  HPI  Patient presents today for a follow-up on Diabetes, Hypertension, and Hyperlipidemia. Tries to follow a low sugar, low sodium, and low fat diet. Exercises irregularly.  Has been eating a few carb's. States her sugar \"bottoms out\". It will get to 150-200 after eating, then drop. It has gotten down to 68. Thinks she's on too much medication. Has gained 12 pounds since her last visit.    Would like to discuss getting a handicap placard. States she cannot walk far in the heat anymore.        Taking current medications which were reviewed.  Problem list discussed.    Overall doing well.  Eating okay.  Staying active.    Has no other new problem /question.    ROS  Constitutional- No activity change. No appetite change.  Eyes- Denies vision changes.  Respiratory- No shortness of breath.  Cardiovascular- No palpitations. No chest pain.  GI- No nausea or vomiting. No diarrhea or constipation. Denies abdominal pain.  Musculoskeletal- Denies joint swelling.  Extremities- No edema.  Neurological- Denies headaches. Denies dizziness.  Skin- No rashes.  Psychiatric/Behavioral- Denies significant anxiety, or depressed mood.     Objective     /70   Pulse 107   Temp 36.8 °C (98.2 °F)   Ht 1.588 m (5' 2.5\")   Wt 96.5 kg (212 lb 12.8 oz)   SpO2 94%   BMI 38.30 kg/m²     Allergies   Allergen Reactions    Ampicillin Hives    Amoxicillin-Pot Clavulanate Rash    Cefuroxime Axetil Rash    Ciprofloxacin Rash    Sulfur Rash       Constitutional-- Well-nourished.  No distress  Eyes- PERRL.  Conjunctiva normal.  Nose- Normal.  No rhinorrhea noted.  Throat- Oropharynx is clear and moist.  Neck- Supple with no thyromegaly.  No significant cervical adenopathy noted.  Pulmonary/Chest- Breath sounds normal with normal effort.  No wheezing.  Heart- Regular rate and rhythm.  No murmur.  Abdomen- Soft and non-tender. "  No masses noted.  Musculoskeletal- Normal ROM.  No significant joint swelling  Extremities- No edema.   Neurological- Alert.  No noted deficits.  Skin- Warm.  No rashes.  Psychiatric/Behavioral- Mood and affect normal.  Behavior normal.     Assessment/Plan   1. DM type 2, controlled, with complication (Multi)  Hemoglobin A1c    Basic metabolic panel      2. Hypertension, unspecified type  Basic metabolic panel      3. Hyperlipidemia, unspecified hyperlipidemia type  Basic metabolic panel      4. Other migraine without status migrainosus, not intractable  butalbital-acetaminophen-caff (Esgic) -40 mg capsule      5. Osteoarthritis of spine with radiculopathy, lumbar region  Disability Placard      6. Wheezing  albuterol 90 mcg/actuation inhaler      7. IGTN (ingrowing toe nail)  doxycycline (Vibramycin) 100 mg capsule      8. Nail fungus  terbinafine (LamISIL) 250 mg tablet             Long talk. Treatment options reviewed.  Diabetes has been under good control.  Suspect low blood sugars.  Cut back on glimepiride to 1/2 pill daily.  Reviewed most recent lab work with patient. Advised patient to remain up to date on routine maintenance and health screening.     Discussed hypertension.  Will continue to monitor.     Osteoarthritis controlled. Educated the patient on osteoarthritis care and management. Educated on muscle strength and exercise.    Educated on nail fungus management.       Educated on headachce care, prevention and management.    Continue and take your medications as prescribed.    Health Maintenance issues discussed.    Importance of healthy diet and regular exercise regimen discussed.    We will contact you with any test results ordered. If you do not hear from us, please contact.    Follow-up as instructed or sooner if any problems or symptoms do not resolve as expected.            Scribe Attestation  By signing my name below, Clementine TRAVIS Scribe   attest that this documentation has been  prepared under the direction and in the presence of Andrae Avila MD.

## 2024-06-25 ENCOUNTER — APPOINTMENT (OUTPATIENT)
Dept: PRIMARY CARE | Facility: CLINIC | Age: 54
End: 2024-06-25
Payer: COMMERCIAL

## 2024-06-25 VITALS
TEMPERATURE: 98.2 F | HEART RATE: 107 BPM | OXYGEN SATURATION: 94 % | HEIGHT: 63 IN | WEIGHT: 212.8 LBS | DIASTOLIC BLOOD PRESSURE: 70 MMHG | BODY MASS INDEX: 37.7 KG/M2 | SYSTOLIC BLOOD PRESSURE: 120 MMHG

## 2024-06-25 DIAGNOSIS — E78.5 HYPERLIPIDEMIA, UNSPECIFIED HYPERLIPIDEMIA TYPE: ICD-10-CM

## 2024-06-25 DIAGNOSIS — I10 HYPERTENSION, UNSPECIFIED TYPE: ICD-10-CM

## 2024-06-25 DIAGNOSIS — G43.809 OTHER MIGRAINE WITHOUT STATUS MIGRAINOSUS, NOT INTRACTABLE: ICD-10-CM

## 2024-06-25 DIAGNOSIS — B35.1 NAIL FUNGUS: ICD-10-CM

## 2024-06-25 DIAGNOSIS — E11.8 DM TYPE 2, CONTROLLED, WITH COMPLICATION (MULTI): Primary | ICD-10-CM

## 2024-06-25 DIAGNOSIS — M47.26 OSTEOARTHRITIS OF SPINE WITH RADICULOPATHY, LUMBAR REGION: ICD-10-CM

## 2024-06-25 DIAGNOSIS — R06.2 WHEEZING: ICD-10-CM

## 2024-06-25 DIAGNOSIS — L60.0 IGTN (INGROWING TOE NAIL): ICD-10-CM

## 2024-06-25 PROCEDURE — 3046F HEMOGLOBIN A1C LEVEL >9.0%: CPT | Performed by: FAMILY MEDICINE

## 2024-06-25 PROCEDURE — 3078F DIAST BP <80 MM HG: CPT | Performed by: FAMILY MEDICINE

## 2024-06-25 PROCEDURE — 99213 OFFICE O/P EST LOW 20 MIN: CPT | Performed by: FAMILY MEDICINE

## 2024-06-25 PROCEDURE — 3074F SYST BP LT 130 MM HG: CPT | Performed by: FAMILY MEDICINE

## 2024-06-25 PROCEDURE — 3060F POS MICROALBUMINURIA REV: CPT | Performed by: FAMILY MEDICINE

## 2024-06-25 PROCEDURE — 3008F BODY MASS INDEX DOCD: CPT | Performed by: FAMILY MEDICINE

## 2024-06-25 RX ORDER — BUTALBITAL, ACETAMINOPHEN AND CAFFEINE 50; 325; 40 MG/1; MG/1; MG/1
1-2 CAPSULE ORAL EVERY 6 HOURS PRN
Qty: 30 CAPSULE | Refills: 5 | Status: SHIPPED | OUTPATIENT
Start: 2024-06-25

## 2024-06-25 RX ORDER — TERBINAFINE HYDROCHLORIDE 250 MG/1
250 TABLET ORAL DAILY
Qty: 30 TABLET | Refills: 0 | Status: SHIPPED | OUTPATIENT
Start: 2024-06-25 | End: 2024-07-25

## 2024-06-25 RX ORDER — ALBUTEROL SULFATE 90 UG/1
2 AEROSOL, METERED RESPIRATORY (INHALATION) EVERY 4 HOURS PRN
Qty: 18 G | Refills: 5 | Status: SHIPPED | OUTPATIENT
Start: 2024-06-25

## 2024-06-25 RX ORDER — DOXYCYCLINE 100 MG/1
100 CAPSULE ORAL 2 TIMES DAILY
Qty: 20 CAPSULE | Refills: 0 | Status: SHIPPED | OUTPATIENT
Start: 2024-06-25 | End: 2024-07-05

## 2024-07-02 DIAGNOSIS — R73.09 ELEVATED GLUCOSE: ICD-10-CM

## 2024-07-02 DIAGNOSIS — E78.5 HYPERLIPIDEMIA, UNSPECIFIED HYPERLIPIDEMIA TYPE: ICD-10-CM

## 2024-07-02 RX ORDER — GLIMEPIRIDE 2 MG/1
TABLET ORAL
Qty: 30 TABLET | Refills: 5 | Status: SHIPPED | OUTPATIENT
Start: 2024-07-02

## 2024-07-02 RX ORDER — ROSUVASTATIN CALCIUM 10 MG/1
10 TABLET, COATED ORAL DAILY
Qty: 90 TABLET | Refills: 1 | Status: SHIPPED | OUTPATIENT
Start: 2024-07-02

## 2024-07-11 DIAGNOSIS — R73.09 ELEVATED GLUCOSE: ICD-10-CM

## 2024-07-11 RX ORDER — GLIMEPIRIDE 2 MG/1
2 TABLET ORAL
Qty: 30 TABLET | Refills: 5 | Status: SHIPPED | OUTPATIENT
Start: 2024-07-11

## 2024-07-11 NOTE — TELEPHONE ENCOUNTER
Rx Refill Request Telephone Encounter    Name:  Giulia Vaz  : 1970     Medication Name:  GLIMEPIRIDE  Dose (Optional):    2 MG  Quantity (Optional):      Directions (Optional):   1 TABLET DAILY    ALLERGIES:   ON FILE    Specific Pharmacy location:  [facility]    Date of last appointment:  24  Date of next appointment:  NONE    **PATIENT IS OUT OF MEDICATION - NEEDS TODAY**

## 2024-07-12 ENCOUNTER — LAB (OUTPATIENT)
Dept: LAB | Facility: LAB | Age: 54
End: 2024-07-12
Payer: COMMERCIAL

## 2024-07-12 DIAGNOSIS — E78.5 HYPERLIPIDEMIA, UNSPECIFIED HYPERLIPIDEMIA TYPE: ICD-10-CM

## 2024-07-12 DIAGNOSIS — I10 HYPERTENSION, UNSPECIFIED TYPE: ICD-10-CM

## 2024-07-12 DIAGNOSIS — E11.8 DM TYPE 2, CONTROLLED, WITH COMPLICATION (MULTI): ICD-10-CM

## 2024-07-12 LAB
ANION GAP SERPL CALC-SCNC: 14 MMOL/L (ref 10–20)
BUN SERPL-MCNC: 11 MG/DL (ref 6–23)
CALCIUM SERPL-MCNC: 9.6 MG/DL (ref 8.6–10.3)
CHLORIDE SERPL-SCNC: 100 MMOL/L (ref 98–107)
CO2 SERPL-SCNC: 30 MMOL/L (ref 21–32)
CREAT SERPL-MCNC: 0.57 MG/DL (ref 0.5–1.05)
EGFRCR SERPLBLD CKD-EPI 2021: >90 ML/MIN/1.73M*2
GLUCOSE SERPL-MCNC: 115 MG/DL (ref 74–99)
POTASSIUM SERPL-SCNC: 3.8 MMOL/L (ref 3.5–5.3)
SODIUM SERPL-SCNC: 140 MMOL/L (ref 136–145)

## 2024-07-12 PROCEDURE — 36415 COLL VENOUS BLD VENIPUNCTURE: CPT

## 2024-07-12 PROCEDURE — 83036 HEMOGLOBIN GLYCOSYLATED A1C: CPT

## 2024-07-12 PROCEDURE — 80048 BASIC METABOLIC PNL TOTAL CA: CPT

## 2024-07-13 LAB
EST. AVERAGE GLUCOSE BLD GHB EST-MCNC: 151 MG/DL
HBA1C MFR BLD: 6.9 %

## 2024-07-15 ENCOUNTER — TELEPHONE (OUTPATIENT)
Dept: PRIMARY CARE | Facility: CLINIC | Age: 54
End: 2024-07-15
Payer: COMMERCIAL

## 2024-07-15 NOTE — TELEPHONE ENCOUNTER
----- Message from Andrae Avila sent at 7/14/2024 10:17 AM EDT -----  Labs are within normal limits or stable.  A1c is now 6.9 indicating very good sugar control.  Much better than last time when it was 9.6.  Continue healthy low sugar diet and follow-up with me per routine.  Please let her know

## 2024-08-20 DIAGNOSIS — E11.8 DM TYPE 2, CONTROLLED, WITH COMPLICATION (MULTI): ICD-10-CM

## 2024-08-20 RX ORDER — METFORMIN HYDROCHLORIDE 500 MG/1
500 TABLET ORAL
Qty: 30 TABLET | Refills: 3 | Status: SHIPPED | OUTPATIENT
Start: 2024-08-20

## 2024-08-20 NOTE — TELEPHONE ENCOUNTER
Rx Refill Request Telephone Encounter    Name:  Giulia Vaz  : 1970     Medication Name:  metFORMIN (Glucophage) 500 mg tablet   Quantity (Optional):    30 refill:4  Directions (Optional):   take 1 tablet by mouth once daily WITH BREAKFAST     Specific Pharmacy location:  [facility] [id number] [facility] [address] [facility]     Date of last appointment:  24

## 2024-09-11 ENCOUNTER — APPOINTMENT (OUTPATIENT)
Dept: PRIMARY CARE | Facility: CLINIC | Age: 54
End: 2024-09-11
Payer: COMMERCIAL

## 2024-09-11 VITALS
HEIGHT: 63 IN | SYSTOLIC BLOOD PRESSURE: 128 MMHG | DIASTOLIC BLOOD PRESSURE: 64 MMHG | BODY MASS INDEX: 37.83 KG/M2 | TEMPERATURE: 97.8 F | WEIGHT: 213.5 LBS | HEART RATE: 105 BPM | OXYGEN SATURATION: 95 % | RESPIRATION RATE: 16 BRPM

## 2024-09-11 DIAGNOSIS — N39.0 URINARY TRACT INFECTION WITHOUT HEMATURIA, SITE UNSPECIFIED: ICD-10-CM

## 2024-09-11 DIAGNOSIS — E78.5 HYPERLIPIDEMIA, UNSPECIFIED HYPERLIPIDEMIA TYPE: ICD-10-CM

## 2024-09-11 DIAGNOSIS — E11.8 DM TYPE 2, CONTROLLED, WITH COMPLICATION (MULTI): ICD-10-CM

## 2024-09-11 DIAGNOSIS — J40 BRONCHITIS: Primary | ICD-10-CM

## 2024-09-11 DIAGNOSIS — R31.9 HEMATURIA, UNSPECIFIED TYPE: ICD-10-CM

## 2024-09-11 DIAGNOSIS — R10.30 LOWER ABDOMINAL PAIN: ICD-10-CM

## 2024-09-11 DIAGNOSIS — I10 HYPERTENSION, UNSPECIFIED TYPE: ICD-10-CM

## 2024-09-11 PROCEDURE — 3074F SYST BP LT 130 MM HG: CPT | Performed by: FAMILY MEDICINE

## 2024-09-11 PROCEDURE — 3044F HG A1C LEVEL LT 7.0%: CPT | Performed by: FAMILY MEDICINE

## 2024-09-11 PROCEDURE — 3008F BODY MASS INDEX DOCD: CPT | Performed by: FAMILY MEDICINE

## 2024-09-11 PROCEDURE — 99214 OFFICE O/P EST MOD 30 MIN: CPT | Performed by: FAMILY MEDICINE

## 2024-09-11 PROCEDURE — 3078F DIAST BP <80 MM HG: CPT | Performed by: FAMILY MEDICINE

## 2024-09-11 PROCEDURE — 4004F PT TOBACCO SCREEN RCVD TLK: CPT | Performed by: FAMILY MEDICINE

## 2024-09-11 PROCEDURE — 3060F POS MICROALBUMINURIA REV: CPT | Performed by: FAMILY MEDICINE

## 2024-09-11 RX ORDER — DOXYCYCLINE 100 MG/1
100 CAPSULE ORAL 2 TIMES DAILY
Qty: 20 CAPSULE | Refills: 0 | Status: SHIPPED | OUTPATIENT
Start: 2024-09-11 | End: 2024-09-21

## 2024-09-11 ASSESSMENT — PATIENT HEALTH QUESTIONNAIRE - PHQ9
2. FEELING DOWN, DEPRESSED OR HOPELESS: NOT AT ALL
SUM OF ALL RESPONSES TO PHQ9 QUESTIONS 1 & 2: 0
1. LITTLE INTEREST OR PLEASURE IN DOING THINGS: NOT AT ALL

## 2024-09-11 ASSESSMENT — ANXIETY QUESTIONNAIRES
5. BEING SO RESTLESS THAT IT IS HARD TO SIT STILL: NOT AT ALL
2. NOT BEING ABLE TO STOP OR CONTROL WORRYING: NOT AT ALL
GAD7 TOTAL SCORE: 0
7. FEELING AFRAID AS IF SOMETHING AWFUL MIGHT HAPPEN: NOT AT ALL
1. FEELING NERVOUS, ANXIOUS, OR ON EDGE: NOT AT ALL
4. TROUBLE RELAXING: NOT AT ALL
IF YOU CHECKED OFF ANY PROBLEMS ON THIS QUESTIONNAIRE, HOW DIFFICULT HAVE THESE PROBLEMS MADE IT FOR YOU TO DO YOUR WORK, TAKE CARE OF THINGS AT HOME, OR GET ALONG WITH OTHER PEOPLE: NOT DIFFICULT AT ALL
3. WORRYING TOO MUCH ABOUT DIFFERENT THINGS: NOT AT ALL
6. BECOMING EASILY ANNOYED OR IRRITABLE: NOT AT ALL

## 2024-09-11 NOTE — PROGRESS NOTES
"Subjective   Patient ID: Giulia Vaz is a 54 y.o. female who presents for Hypertension, Hyperlipidemia, Diabetes, and UTI.  HPI  Patient presents in office today for HTN, HLD, and DM follow up. Tries to follow a low sugar, low sodium, low fat diet. Stays active. Does not check sugars/BP at home. Denies any side effects from medications.      Patient is following up today for a UTI. She was seen by urgent care on 8/23 due to not being able to urinate.. Patient states that she went to Urgent care on the corner and they advised her that there is blood in her urine and that she did not have an infection. Patient was given Pyridium . Patient states that she is feeling okay.     Patient states that she also cracked a rib due to coughing so hard. Patient states that she is very congested. The coughing started on the 23rd as well. Patient has been taking OTC decongestants with no relief. Patient states that she is having sinus pressure/ congestions as well     Taking current medications which were reviewed.  Problem list discussed.    Overall doing well.  Eating okay.  Staying active.    Has no other new problem /question.     ROS  Constitutional- No activity change. No appetite change.  Eyes- Denies vision changes.  Respiratory- coughing, congestion  Cardiovascular- No palpitations. No chest pain.  GI- No nausea or vomiting. No diarrhea or constipation. Denies abdominal pain.  Musculoskeletal- myalgias  Extremities- No edema.  Neurological- Denies headaches. Denies dizziness.  Skin- No rashes.  Psychiatric/Behavioral- Denies significant anxiety, or depressed mood.     Objective     /64   Pulse 105   Temp 36.6 °C (97.8 °F)   Resp 16   Ht 1.588 m (5' 2.5\")   Wt 96.8 kg (213 lb 8 oz)   SpO2 95%   BMI 38.43 kg/m²     Allergies   Allergen Reactions    Ampicillin Hives    Amoxicillin-Pot Clavulanate Rash    Cefuroxime Axetil Rash    Ciprofloxacin Rash    Sulfur Rash       Constitutional-- Well-nourished.  No " distress  Head- unremarkable.  Eyes- PERRL.  Conjunctiva normal.  Nose- Normal.  No rhinorrhea noted.  Throat- Oropharynx is clear and moist.  Neck- Supple with no thyromegaly.  No significant cervical adenopathy noted.  Pulmonary/Chest-  mild upper airway rhonchi noted  Heart- Regular rate and rhythm.  No murmur.  Abdomen- Soft and non-tender.  No masses noted.  Musculoskeletal-mild generalized lower abdominal pain to palpation.  No rebound organomegaly.  Bowel sounds normal.  Extremities- No edema.   Neurological- Alert.  No noted deficits.  Skin- Warm.  No rashes.  Psychiatric/Behavioral- Mood and affect normal.  Behavior normal.     UA noted significant for continued hematuria    Assessment/Plan   1. Bronchitis  doxycycline (Vibramycin) 100 mg capsule      2. Hyperlipidemia, unspecified hyperlipidemia type        3. Hypertension, unspecified type        4. DM type 2, controlled, with complication (Multi)        5. Urinary tract infection without hematuria, site unspecified        6. Lower abdominal pain  CT abdomen pelvis wo IV contrast    CANCELED: CT abdomen pelvis wo IV contrast      7. Hematuria, unspecified type  CT abdomen pelvis wo IV contrast    CANCELED: CT abdomen pelvis wo IV contrast             Long talk. Treatment options reviewed.    Reviewed most recent lab work with patient. Advised patient to remain up to date on routine maintenance and health screening.      Discussed cough. Discussed chest pain.  Discussed Bronchitis. Complete imaging as discussed.  Over-the-counter cough and cold medicine as needed for symptom relief    Abdominal pain with hematuria discussed.  Possible kidney stone.  Arrange for CT scan of the abdomen and pelvis.    Continue and take your medications as prescribed.    Health Maintenance issues discussed.    Importance of healthy diet and regular exercise regimen discussed.    We will contact you with any test results ordered. If you do not hear from us, please  contact.    Follow-up as instructed or sooner if any problems or symptoms do not resolve as expected.         Scribe Attestation  By signing my name below, IClementine Scribe   attest that this documentation has been prepared under the direction and in the presence of Andrae Avila MD.

## 2024-09-12 ENCOUNTER — TELEPHONE (OUTPATIENT)
Dept: PRIMARY CARE | Facility: CLINIC | Age: 54
End: 2024-09-12
Payer: COMMERCIAL

## 2024-09-12 ENCOUNTER — HOSPITAL ENCOUNTER (OUTPATIENT)
Dept: RADIOLOGY | Facility: HOSPITAL | Age: 54
Discharge: HOME | End: 2024-09-12
Payer: COMMERCIAL

## 2024-09-12 DIAGNOSIS — R10.30 LOWER ABDOMINAL PAIN: ICD-10-CM

## 2024-09-12 DIAGNOSIS — K57.92 DIVERTICULITIS: Primary | ICD-10-CM

## 2024-09-12 DIAGNOSIS — R31.9 HEMATURIA, UNSPECIFIED TYPE: ICD-10-CM

## 2024-09-12 PROCEDURE — 74176 CT ABD & PELVIS W/O CONTRAST: CPT | Performed by: RADIOLOGY

## 2024-09-12 PROCEDURE — 74176 CT ABD & PELVIS W/O CONTRAST: CPT

## 2024-09-12 RX ORDER — METRONIDAZOLE 500 MG/1
500 TABLET ORAL 3 TIMES DAILY
Qty: 30 TABLET | Refills: 0 | Status: SHIPPED | OUTPATIENT
Start: 2024-09-12 | End: 2024-09-22

## 2024-09-12 NOTE — TELEPHONE ENCOUNTER
Daniel Suggs, DO Kristan Diaz MA; Do [id number] [employer] Clerical; Andrae Hinojosa MD16 minutes ago (4:15 PM)       Let her know her CT scan showed some inflammation of her lower colon its mild I would continue the doxycycline because of her multiple allergies we also will add Flagyl, she will take both the pills together,, I will forward this to Dr. Dr hinojosa also so he is aware, unfortunately she has multiple allergies to the medicines that are preferred,, if she worsens she should go to the hospital if not give Dr. Dr hinojosa an update on Monday         Called patient and she is aware.

## 2024-09-12 NOTE — TELEPHONE ENCOUNTER
Patient is calling because she had a CT abdomen and pelvis done today that Dr Avila had ordered STAT yesterday for her.( She saw him yesterday and the CT scan was done today)  Wanted to know if you could review the results or if you wanted to forward them to Dr Avila for him to review tomorrow?    Please advise  Thanks      Patient's # [phone]

## 2024-09-18 ENCOUNTER — TELEPHONE (OUTPATIENT)
Dept: PRIMARY CARE | Facility: CLINIC | Age: 54
End: 2024-09-18
Payer: COMMERCIAL

## 2024-09-18 NOTE — TELEPHONE ENCOUNTER
Andrae Avila MD  Do [id number] [employer] [id number] minutes ago (1:01 PM)       Please let her know I agree with 2 times a day.  Have her keep us updated       Called patient she will let us know if any changes

## 2024-09-18 NOTE — TELEPHONE ENCOUNTER
FYI  Patient wanted to update you on the antibiotic metronidazole 500 mg  he is taking 3 txs a day, woke up this morning with terrible heartburn and diarrhea, so he is only going to take  2 tx a day and try that to see if that helps.   [phone]

## 2024-09-19 ENCOUNTER — TELEPHONE (OUTPATIENT)
Dept: PRIMARY CARE | Facility: CLINIC | Age: 54
End: 2024-09-19
Payer: COMMERCIAL

## 2024-09-19 NOTE — TELEPHONE ENCOUNTER
Thinks she has a broken rib, she wants to get xray, she is in extreme pain, called off work today, please advise?

## 2024-09-19 NOTE — TELEPHONE ENCOUNTER
Andrae Avila MD  Do [id number] [employer] Clerical1 minute ago (1:27 PM)       Okay to open up my 945 spot for her tomorrow.  Please let her know and arrange      Elisabeth Mcgrath routed conversation to Andrae Avila MD1 hour ago (12:04 PM)     Elisabeth Mcgrath1 hour ago (12:04 PM)     LT  Andrae Avila MD  Do [id number] [employer] Clinical Support Staff3 minutes ago (11:50 AM)        Is give her a call and get an update  Did she fall?.  What side is the pain on?  Where is the pain?.  Usually we do not order x-rays until we see a person.  Please let me know   Patient thinks she broke them from coughing, 9 days ago, she was bending down yesterday, and felt like knife went in her.   Also is going to need note for off work            Called patient to see if she could come in tomorrow at 945am. She said she's going to go to [facility] ER. She said she feels awful and debilitating and is going to go to the ER    FYI

## 2024-09-19 NOTE — TELEPHONE ENCOUNTER
Andrae Avila MD  Do [id number] [place] [address] Clinical Support Staff3 minutes ago (11:50 AM)       Is give her a call and get an update  Did she fall?.  What side is the pain on?  Where is the pain?.  Usually we do not order x-rays until we see a person.  Please let me know   Patient thinks she broke them from coughing, 9 days ago, she was bending down yesterday, and felt like knife went in her.   Also is going to need note for off work

## 2024-09-20 ENCOUNTER — OFFICE VISIT (OUTPATIENT)
Dept: PRIMARY CARE | Facility: CLINIC | Age: 54
End: 2024-09-20
Payer: COMMERCIAL

## 2024-09-20 ENCOUNTER — TELEPHONE (OUTPATIENT)
Dept: PRIMARY CARE | Facility: CLINIC | Age: 54
End: 2024-09-20

## 2024-09-20 VITALS
TEMPERATURE: 97.6 F | RESPIRATION RATE: 16 BRPM | HEIGHT: 63 IN | HEART RATE: 103 BPM | OXYGEN SATURATION: 92 % | SYSTOLIC BLOOD PRESSURE: 110 MMHG | WEIGHT: 214 LBS | DIASTOLIC BLOOD PRESSURE: 68 MMHG | BODY MASS INDEX: 37.92 KG/M2

## 2024-09-20 DIAGNOSIS — E11.8 DM TYPE 2, CONTROLLED, WITH COMPLICATION (MULTI): Primary | ICD-10-CM

## 2024-09-20 DIAGNOSIS — J40 BRONCHITIS: ICD-10-CM

## 2024-09-20 DIAGNOSIS — K21.9 GERD WITHOUT ESOPHAGITIS: ICD-10-CM

## 2024-09-20 DIAGNOSIS — R07.81 RIB PAIN: ICD-10-CM

## 2024-09-20 DIAGNOSIS — E78.5 HYPERLIPIDEMIA, UNSPECIFIED HYPERLIPIDEMIA TYPE: ICD-10-CM

## 2024-09-20 PROCEDURE — 3008F BODY MASS INDEX DOCD: CPT | Performed by: FAMILY MEDICINE

## 2024-09-20 PROCEDURE — 3074F SYST BP LT 130 MM HG: CPT | Performed by: FAMILY MEDICINE

## 2024-09-20 PROCEDURE — 3044F HG A1C LEVEL LT 7.0%: CPT | Performed by: FAMILY MEDICINE

## 2024-09-20 PROCEDURE — 3060F POS MICROALBUMINURIA REV: CPT | Performed by: FAMILY MEDICINE

## 2024-09-20 PROCEDURE — 3078F DIAST BP <80 MM HG: CPT | Performed by: FAMILY MEDICINE

## 2024-09-20 PROCEDURE — 99213 OFFICE O/P EST LOW 20 MIN: CPT | Performed by: FAMILY MEDICINE

## 2024-09-20 RX ORDER — PANTOPRAZOLE SODIUM 40 MG/1
40 TABLET, DELAYED RELEASE ORAL DAILY
Qty: 30 TABLET | Refills: 3 | Status: SHIPPED | OUTPATIENT
Start: 2024-09-20 | End: 2025-01-18

## 2024-09-20 RX ORDER — BLOOD-GLUCOSE SENSOR
EACH MISCELLANEOUS
Qty: 6 EACH | Refills: 3 | Status: SHIPPED | OUTPATIENT
Start: 2024-09-20

## 2024-09-20 ASSESSMENT — PATIENT HEALTH QUESTIONNAIRE - PHQ9
2. FEELING DOWN, DEPRESSED OR HOPELESS: NOT AT ALL
1. LITTLE INTEREST OR PLEASURE IN DOING THINGS: NOT AT ALL
SUM OF ALL RESPONSES TO PHQ9 QUESTIONS 1 & 2: 0

## 2024-09-20 ASSESSMENT — ANXIETY QUESTIONNAIRES
6. BECOMING EASILY ANNOYED OR IRRITABLE: NOT AT ALL
2. NOT BEING ABLE TO STOP OR CONTROL WORRYING: NOT AT ALL
GAD7 TOTAL SCORE: 0
4. TROUBLE RELAXING: NOT AT ALL
1. FEELING NERVOUS, ANXIOUS, OR ON EDGE: NOT AT ALL
7. FEELING AFRAID AS IF SOMETHING AWFUL MIGHT HAPPEN: NOT AT ALL
3. WORRYING TOO MUCH ABOUT DIFFERENT THINGS: NOT AT ALL
5. BEING SO RESTLESS THAT IT IS HARD TO SIT STILL: NOT AT ALL
IF YOU CHECKED OFF ANY PROBLEMS ON THIS QUESTIONNAIRE, HOW DIFFICULT HAVE THESE PROBLEMS MADE IT FOR YOU TO DO YOUR WORK, TAKE CARE OF THINGS AT HOME, OR GET ALONG WITH OTHER PEOPLE: NOT DIFFICULT AT ALL

## 2024-09-20 ASSESSMENT — ENCOUNTER SYMPTOMS
DEPRESSION: 0
OCCASIONAL FEELINGS OF UNSTEADINESS: 0
LOSS OF SENSATION IN FEET: 0

## 2024-09-20 NOTE — LETTER
September 20, 2024     Patient: Giulia Vaz   YOB: 1970   Date of Visit: 9/20/2024       To Whom It May Concern:    Giulia Vaz was seen in my clinic on 9/20/2024 for an appointment.  Please excuse from work on 9/19/2024 and 9/20/2024.  May return to work on 9/23/2024.    If you have any questions or concerns, please don't hesitate to call.         Sincerely,         Andrae Avila MD

## 2024-09-20 NOTE — PROGRESS NOTES
"Subjective   Patient ID: Giulia Vaz is a 54 y.o. female who presents for Cough and Chest Pain.  HPI  Patient was seen yesterday at the [facility] for cough and rib pain. Patient was advised that it could be torn cartilage that is causing her pain. Patient states that she is feeling a little better when she breathes and the pain is not so sharp. Pain is a 6/10 at the moment.       Taking current medications which were reviewed.  Problem list discussed.    Overall doing well.  Eating okay.  Staying active.    Has no other new problem /question.     ROS  Constitutional- No activity change. No appetite change.  Eyes- Denies vision changes.  Respiratory- cough  Cardiovascular-chest pain.  GI- No nausea or vomiting. No diarrhea or constipation. Denies abdominal pain.  Musculoskeletal- Denies joint swelling.  Extremities- No edema.  Neurological- Denies headaches. Denies dizziness.  Skin- No rashes.  Psychiatric/Behavioral- Denies significant anxiety, or depressed mood.     Objective     /68   Pulse 103   Temp 36.4 °C (97.6 °F)   Resp 16   Ht 1.588 m (5' 2.5\")   Wt 97.1 kg (214 lb)   SpO2 92%   BMI 38.52 kg/m²     Allergies   Allergen Reactions    Ampicillin Hives    Amoxicillin-Pot Clavulanate Rash    Cefuroxime Axetil Rash    Ciprofloxacin Rash    Sulfur Rash       Constitutional-- Well-nourished.  No distress  Head- unremarkable.  Eyes- PERRL.  Conjunctiva normal.  Nose- Normal.  No rhinorrhea noted.  Throat- Oropharynx is clear and moist.  Neck- Supple with no thyromegaly.  No significant cervical adenopathy noted.  Pulmonary/Chest- Breath sounds normal with normal effort.  No wheezing.  Mild discomfort chest wall right side lower anterior rib cage area consistent with strain/tendinitis  Heart- Regular rate and rhythm.  No murmur.  Abdomen- Soft and non-tender.  No masses noted.  Musculoskeletal- Normal ROM.  No significant joint swelling  Extremities- No edema.   Neurological- Alert.  No " noted deficits.  Skin- Warm.  No rashes.  Psychiatric/Behavioral- Mood and affect normal.  Behavior normal.     Assessment/Plan   1. DM type 2, controlled, with complication (Multi)  FreeStyle Rupesh 3 Sensor device      2. Rib pain        3. Hyperlipidemia, unspecified hyperlipidemia type        4. Bronchitis        5. GERD without esophagitis  pantoprazole (ProtoNix) 40 mg EC tablet             Long talk. Treatment options reviewed.    Reviewed most recent lab work with patient. Advised patient to remain up to date on routine maintenance and health screening.  Maintain appointments with specialists as scheduled.  Advised patient to remain up to date on immunizations.     Discussed bronchitis. Advised patient to take over the counter cold and flu medicine as needed for symptom relief.  Over-the-counter Motrin to be used for pain and symptom relief    Discussed Diabetes Mellitus. Continue to monitor glucose levels. Educated on diabetes, low-calorie diet and exercise. Recommended eye exam once a year. Educated on diabetic foot care.    Continue and take your medications as prescribed.    Health Maintenance issues discussed.    Importance of healthy diet and regular exercise regimen discussed.    We will contact you with any test results ordered. If you do not hear from us, please contact.    Follow-up as instructed or sooner if any problems or symptoms do not resolve as expected.       Scribe Attestation  By signing my name below, Clementine TRAVIS Scribe   attest that this documentation has been prepared under the direction and in the presence of Andrae Avila MD.

## 2024-09-23 ENCOUNTER — TELEPHONE (OUTPATIENT)
Dept: PRIMARY CARE | Facility: CLINIC | Age: 54
End: 2024-09-23
Payer: COMMERCIAL

## 2024-09-23 NOTE — LETTER
September 23, 2024     Patient: Giulia Vaz   YOB: 1970   Date of Visit: 9/20/2024       To Whom It May Concern:    Giulia Vaz was seen in my clinic on 9/20/2024. Please excuse her from 9/19/2024- 9/27/2024.    If you have any questions or concerns, please don't hesitate to call.         Sincerely,         Andrae Avila MD

## 2024-10-24 DIAGNOSIS — E11.8 DM TYPE 2, CONTROLLED, WITH COMPLICATION (MULTI): ICD-10-CM

## 2024-10-24 NOTE — TELEPHONE ENCOUNTER
[facility] IS CALLING - THE FREESTYLE MIROSLAVA 3 ARE NOT IN STOCK - THEY ONLY THING THAT IS AVAILABLE IS THE MIROSLAVA 3 PLUS, WONDERING IF THEY CAN GET A PRESCRIPTION SENT OVER FOR THAT?

## 2024-10-25 RX ORDER — HYDROCHLOROTHIAZIDE 12.5 MG/1
CAPSULE ORAL
Qty: 2 EACH | Refills: 2 | Status: SHIPPED | OUTPATIENT
Start: 2024-10-25

## 2024-10-25 NOTE — TELEPHONE ENCOUNTER
Marcelle Barry, APRN-CNP, DNP  Do [id number] [facility] Clinical Support Staff1 minute ago (1:48 PM)     SP  Ok to switch, assuming it will be covered by her insurance. Please pend necessary orders and send back to me.

## 2024-12-09 DIAGNOSIS — E11.8 DM TYPE 2, CONTROLLED, WITH COMPLICATION (MULTI): ICD-10-CM

## 2024-12-09 RX ORDER — METFORMIN HYDROCHLORIDE 500 MG/1
500 TABLET ORAL
Qty: 90 TABLET | Refills: 0 | Status: SHIPPED | OUTPATIENT
Start: 2024-12-09

## 2025-01-10 DIAGNOSIS — K21.9 GERD WITHOUT ESOPHAGITIS: ICD-10-CM

## 2025-01-10 DIAGNOSIS — R73.09 ELEVATED GLUCOSE: ICD-10-CM

## 2025-01-10 RX ORDER — GLIMEPIRIDE 2 MG/1
2 TABLET ORAL
Qty: 30 TABLET | Refills: 1 | Status: SHIPPED | OUTPATIENT
Start: 2025-01-10

## 2025-01-10 RX ORDER — PANTOPRAZOLE SODIUM 40 MG/1
40 TABLET, DELAYED RELEASE ORAL DAILY
Qty: 30 TABLET | Refills: 1 | Status: SHIPPED | OUTPATIENT
Start: 2025-01-10

## 2025-02-03 DIAGNOSIS — E11.8 DM TYPE 2, CONTROLLED, WITH COMPLICATION (MULTI): ICD-10-CM

## 2025-02-03 RX ORDER — BLOOD-GLUCOSE SENSOR
EACH MISCELLANEOUS
Qty: 2 EACH | Refills: 2 | Status: SHIPPED | OUTPATIENT
Start: 2025-02-03 | End: 2025-02-04

## 2025-02-04 RX ORDER — BLOOD-GLUCOSE SENSOR
EACH MISCELLANEOUS
Qty: 2 EACH | Refills: 2 | Status: SHIPPED | OUTPATIENT
Start: 2025-02-04

## 2025-02-05 ENCOUNTER — APPOINTMENT (OUTPATIENT)
Dept: PRIMARY CARE | Facility: CLINIC | Age: 55
End: 2025-02-05
Payer: COMMERCIAL

## 2025-02-05 VITALS
HEART RATE: 87 BPM | WEIGHT: 218 LBS | BODY MASS INDEX: 38.62 KG/M2 | TEMPERATURE: 97 F | HEIGHT: 63 IN | RESPIRATION RATE: 16 BRPM | SYSTOLIC BLOOD PRESSURE: 128 MMHG | OXYGEN SATURATION: 98 % | DIASTOLIC BLOOD PRESSURE: 70 MMHG

## 2025-02-05 DIAGNOSIS — E78.5 HYPERLIPIDEMIA, UNSPECIFIED HYPERLIPIDEMIA TYPE: ICD-10-CM

## 2025-02-05 DIAGNOSIS — E11.8 DM TYPE 2, CONTROLLED, WITH COMPLICATION (MULTI): ICD-10-CM

## 2025-02-05 DIAGNOSIS — G43.809 OTHER MIGRAINE WITHOUT STATUS MIGRAINOSUS, NOT INTRACTABLE: ICD-10-CM

## 2025-02-05 DIAGNOSIS — Z00.00 ROUTINE GENERAL MEDICAL EXAMINATION AT A HEALTH CARE FACILITY: Primary | ICD-10-CM

## 2025-02-05 DIAGNOSIS — J44.9 CHRONIC OBSTRUCTIVE PULMONARY DISEASE, UNSPECIFIED COPD TYPE (MULTI): ICD-10-CM

## 2025-02-05 DIAGNOSIS — E66.812 CLASS 2 OBESITY DUE TO EXCESS CALORIES WITHOUT SERIOUS COMORBIDITY WITH BODY MASS INDEX (BMI) OF 39.0 TO 39.9 IN ADULT: ICD-10-CM

## 2025-02-05 DIAGNOSIS — E66.09 CLASS 2 OBESITY DUE TO EXCESS CALORIES WITHOUT SERIOUS COMORBIDITY WITH BODY MASS INDEX (BMI) OF 39.0 TO 39.9 IN ADULT: ICD-10-CM

## 2025-02-05 DIAGNOSIS — I10 HYPERTENSION, UNSPECIFIED TYPE: ICD-10-CM

## 2025-02-05 DIAGNOSIS — J01.90 ACUTE NON-RECURRENT SINUSITIS, UNSPECIFIED LOCATION: ICD-10-CM

## 2025-02-05 DIAGNOSIS — J96.01 ACUTE RESPIRATORY FAILURE WITH HYPOXIA (MULTI): ICD-10-CM

## 2025-02-05 DIAGNOSIS — Z12.31 SCREENING MAMMOGRAM FOR BREAST CANCER: ICD-10-CM

## 2025-02-05 PROCEDURE — G0439 PPPS, SUBSEQ VISIT: HCPCS | Performed by: FAMILY MEDICINE

## 2025-02-05 PROCEDURE — 3008F BODY MASS INDEX DOCD: CPT | Performed by: FAMILY MEDICINE

## 2025-02-05 PROCEDURE — 3074F SYST BP LT 130 MM HG: CPT | Performed by: FAMILY MEDICINE

## 2025-02-05 PROCEDURE — 3078F DIAST BP <80 MM HG: CPT | Performed by: FAMILY MEDICINE

## 2025-02-05 RX ORDER — IRBESARTAN AND HYDROCHLOROTHIAZIDE 300; 12.5 MG/1; MG/1
1 TABLET, FILM COATED ORAL DAILY
Qty: 90 TABLET | Refills: 1 | Status: SHIPPED | OUTPATIENT
Start: 2025-02-05

## 2025-02-05 RX ORDER — METFORMIN HYDROCHLORIDE 500 MG/1
500 TABLET ORAL
Qty: 90 TABLET | Refills: 1 | Status: SHIPPED | OUTPATIENT
Start: 2025-02-05

## 2025-02-05 RX ORDER — BUTALBITAL, ACETAMINOPHEN AND CAFFEINE 50; 325; 40 MG/1; MG/1; MG/1
1 CAPSULE ORAL EVERY 6 HOURS PRN
Qty: 40 CAPSULE | Refills: 2 | Status: SHIPPED | OUTPATIENT
Start: 2025-02-05

## 2025-02-05 RX ORDER — AZITHROMYCIN 250 MG/1
TABLET, FILM COATED ORAL
Qty: 6 TABLET | Refills: 0 | Status: SHIPPED | OUTPATIENT
Start: 2025-02-05 | End: 2025-02-10

## 2025-02-05 ASSESSMENT — PATIENT HEALTH QUESTIONNAIRE - PHQ9
1. LITTLE INTEREST OR PLEASURE IN DOING THINGS: NOT AT ALL
2. FEELING DOWN, DEPRESSED OR HOPELESS: NOT AT ALL
SUM OF ALL RESPONSES TO PHQ9 QUESTIONS 1 AND 2: 0

## 2025-02-05 NOTE — PROGRESS NOTES
"Subjective   Patient ID: Giulia Vaz is a 54 y.o. female who presents for Annual Exam, Diabetes, and Hypertension.  HPI    Patient presents today for a follow-up on Annual Exam,  Diabetes, Hypertension, and Hyperlipidemia. Does follow a low sugar, low sodium, and low fat diet. Does  check sugar and BP at home. Staying active . Blood work ordered 2/5/25.     Taking current medications which were reviewed.  Problem list discussed.    Overall doing well.  Eating okay.  Staying active.    Has no other new problem /question.      ROS  Constitutional- No activity change. No appetite change.  Eyes- Denies vision changes.  Respiratory- No shortness of breath.  Cardiovascular- No palpitations. No chest pain.  GI- No nausea or vomiting. No diarrhea or constipation. Denies abdominal pain.  Musculoskeletal- Denies joint swelling.  Extremities- No edema.  Neurological- Denies dizziness.  Skin- No rashes.  Psychiatric/Behavioral- Denies significant anxiety, or depressed mood.     Objective     /70 (BP Location: Left arm, Patient Position: Sitting, BP Cuff Size: Adult long)   Pulse 87   Temp 36.1 °C (97 °F) (Temporal)   Resp 16   Ht 1.588 m (5' 2.5\")   Wt 98.9 kg (218 lb)   SpO2 98%   BMI 39.24 kg/m²     Allergies   Allergen Reactions    Ampicillin Hives    Amoxicillin-Pot Clavulanate Rash    Cefuroxime Axetil Rash    Ciprofloxacin Rash    Sulfur Rash       Constitutional-- Well-nourished.  No distress  Head- unremarkable  Eyes- PERRL.  Conjunctiva normal.  Nose-moderate nasal congestion with some exercise pressure  Throat- Oropharynx is clear and moist.  Neck- Supple with no thyromegaly.  No significant cervical adenopathy noted.  Pulmonary/Chest- Breath sounds normal with normal effort.  No wheezing.  Heart- Regular rate and rhythm.  No murmur.  Abdomen- Soft and non-tender.  No masses noted.  Musculoskeletal- Normal ROM.  No significant joint swelling  Extremities- No edema.   Neurological- Alert.  No noted " deficits.  Skin- Warm.  No rashes.  Psychiatric/Behavioral- Mood and affect normal.  Behavior normal.     Assessment/Plan   1. Routine general medical examination at a health care facility        2. DM type 2, controlled, with complication (Multi)  Hemoglobin A1C    metFORMIN (Glucophage) 500 mg tablet    CBC and Auto Differential    Basic Metabolic Panel    Hemoglobin A1C    CBC and Auto Differential    Basic Metabolic Panel      3. Hypertension, unspecified type  irbesartan-hydrochlorothiazide (Avalide) 300-12.5 mg tablet    CBC and Auto Differential    Basic Metabolic Panel    CBC and Auto Differential    Basic Metabolic Panel      4. Hyperlipidemia, unspecified hyperlipidemia type  Lipid Panel    Lipid Panel      5. Acute non-recurrent sinusitis, unspecified location  azithromycin (Zithromax) 250 mg tablet      6. Acute respiratory failure with hypoxia (Multi)        7. BMI 39.0-39.9,adult        8. Class 2 obesity due to excess calories without serious comorbidity with body mass index (BMI) of 39.0 to 39.9 in adult        9. Other migraine without status migrainosus, not intractable  butalbital-acetaminophen-caff (Esgic) -40 mg capsule      10. Screening mammogram for breast cancer  BI mammo bilateral screening tomosynthesis      11. Chronic obstructive pulmonary disease, unspecified COPD type (Multi)               Long talk. Treatment options reviewed.    Reviewed most recent lab work with patient. Advised patient to remain up to date on routine maintenance and health screening.  Maintain appointments with specialists as scheduled.  Advised patient to remain up to date on immunizations.     Discussed sinusitis and related symptoms. Take Zithromax as discussed. Continue to monitor.     Discussed Diabetes Mellitus. Continue to monitor glucose levels. Educated on diabetes, low-calorie diet and exercise. Recommended eye exam once a year. Educated on diabetic foot care.    Discussed hypertension.      Discussed importance of natural sources of nutrition.  Advised patient to consume vegetables, salads, fruits, nuts, and proteins such as fish and chicken.  Discussed portion control.      Discussed the importance of routine stretching and exercise.     Complete blood work as discussed. Advised patient to remain properly hydrated.     Continue and take your medications as prescribed.    Health Maintenance issues discussed.    Importance of healthy diet and regular exercise regimen discussed.    We will contact you with any test results ordered. If you do not hear from us, please contact.    Follow-up as instructed or sooner if any problems or symptoms do not resolve as expected.             Scribe Attestation  By signing my name below, IClementine Scribe   attest that this documentation has been prepared under the direction and in the presence of Andrae Avila MD.

## 2025-02-11 DIAGNOSIS — E78.5 HYPERLIPIDEMIA, UNSPECIFIED HYPERLIPIDEMIA TYPE: ICD-10-CM

## 2025-02-11 RX ORDER — ROSUVASTATIN CALCIUM 10 MG/1
10 TABLET, COATED ORAL DAILY
Qty: 90 TABLET | Refills: 1 | Status: SHIPPED | OUTPATIENT
Start: 2025-02-11

## 2025-03-12 DIAGNOSIS — R73.09 ELEVATED GLUCOSE: ICD-10-CM

## 2025-03-12 DIAGNOSIS — G43.809 OTHER MIGRAINE WITHOUT STATUS MIGRAINOSUS, NOT INTRACTABLE: ICD-10-CM

## 2025-03-12 DIAGNOSIS — K21.9 GERD WITHOUT ESOPHAGITIS: ICD-10-CM

## 2025-03-12 RX ORDER — BUTALBITAL, ACETAMINOPHEN AND CAFFEINE 50; 325; 40 MG/1; MG/1; MG/1
CAPSULE ORAL
Qty: 40 CAPSULE | Refills: 0 | Status: SHIPPED | OUTPATIENT
Start: 2025-03-12

## 2025-03-12 RX ORDER — GLIMEPIRIDE 2 MG/1
TABLET ORAL
Qty: 30 TABLET | Refills: 4 | Status: SHIPPED | OUTPATIENT
Start: 2025-03-12

## 2025-03-12 RX ORDER — PANTOPRAZOLE SODIUM 40 MG/1
40 TABLET, DELAYED RELEASE ORAL DAILY
Qty: 30 TABLET | Refills: 4 | Status: SHIPPED | OUTPATIENT
Start: 2025-03-12

## 2025-03-22 DIAGNOSIS — G43.809 OTHER MIGRAINE WITHOUT STATUS MIGRAINOSUS, NOT INTRACTABLE: ICD-10-CM

## 2025-03-24 RX ORDER — BUTALBITAL, ACETAMINOPHEN AND CAFFEINE 50; 325; 40 MG/1; MG/1; MG/1
CAPSULE ORAL
Qty: 40 CAPSULE | Refills: 0 | Status: SHIPPED | OUTPATIENT
Start: 2025-03-24

## 2025-04-05 LAB
ANION GAP SERPL CALCULATED.4IONS-SCNC: 12 MMOL/L (CALC) (ref 7–17)
BASOPHILS # BLD AUTO: 49 CELLS/UL (ref 0–200)
BASOPHILS NFR BLD AUTO: 0.7 %
BUN SERPL-MCNC: 10 MG/DL (ref 7–25)
BUN/CREAT SERPL: ABNORMAL (CALC) (ref 6–22)
CALCIUM SERPL-MCNC: 9.3 MG/DL (ref 8.6–10.4)
CHLORIDE SERPL-SCNC: 98 MMOL/L (ref 98–110)
CHOLEST SERPL-MCNC: 221 MG/DL
CHOLEST/HDLC SERPL: 4.4 (CALC)
CO2 SERPL-SCNC: 31 MMOL/L (ref 20–32)
CREAT SERPL-MCNC: 0.55 MG/DL (ref 0.5–1.03)
EGFRCR SERPLBLD CKD-EPI 2021: 107 ML/MIN/1.73M2
EOSINOPHIL # BLD AUTO: 77 CELLS/UL (ref 15–500)
EOSINOPHIL NFR BLD AUTO: 1.1 %
ERYTHROCYTE [DISTWIDTH] IN BLOOD BY AUTOMATED COUNT: 13.9 % (ref 11–15)
EST. AVERAGE GLUCOSE BLD GHB EST-MCNC: 169 MG/DL
EST. AVERAGE GLUCOSE BLD GHB EST-SCNC: 9.3 MMOL/L
GLUCOSE SERPL-MCNC: 103 MG/DL (ref 65–99)
HBA1C MFR BLD: 7.5 % OF TOTAL HGB
HCT VFR BLD AUTO: 55.6 % (ref 35–45)
HDLC SERPL-MCNC: 50 MG/DL
HGB BLD-MCNC: 18.3 G/DL (ref 11.7–15.5)
LDLC SERPL CALC-MCNC: ABNORMAL MG/DL
LYMPHOCYTES # BLD AUTO: 1911 CELLS/UL (ref 850–3900)
LYMPHOCYTES NFR BLD AUTO: 27.3 %
MCH RBC QN AUTO: 31.8 PG (ref 27–33)
MCHC RBC AUTO-ENTMCNC: 32.9 G/DL (ref 32–36)
MCV RBC AUTO: 96.5 FL (ref 80–100)
MONOCYTES # BLD AUTO: 490 CELLS/UL (ref 200–950)
MONOCYTES NFR BLD AUTO: 7 %
NEUTROPHILS # BLD AUTO: 4473 CELLS/UL (ref 1500–7800)
NEUTROPHILS NFR BLD AUTO: 63.9 %
NONHDLC SERPL-MCNC: 171 MG/DL (CALC)
PLATELET # BLD AUTO: 199 THOUSAND/UL (ref 140–400)
PMV BLD REES-ECKER: 9.6 FL (ref 7.5–12.5)
POTASSIUM SERPL-SCNC: 4.3 MMOL/L (ref 3.5–5.3)
RBC # BLD AUTO: 5.76 MILLION/UL (ref 3.8–5.1)
SODIUM SERPL-SCNC: 141 MMOL/L (ref 135–146)
TRIGL SERPL-MCNC: 489 MG/DL
WBC # BLD AUTO: 7 THOUSAND/UL (ref 3.8–10.8)

## 2025-04-07 ENCOUNTER — TELEPHONE (OUTPATIENT)
Dept: PRIMARY CARE | Facility: CLINIC | Age: 55
End: 2025-04-07
Payer: COMMERCIAL

## 2025-04-07 NOTE — TELEPHONE ENCOUNTER
SPOKE WITH PATIENT - SHE IS AWARE TO STICK WITH CLEAR LIQUIDS AND LIGHT FOODS - SCHEDULED FOLLOW UP APPT FOR TOMORROW AT 11:15 AM

## 2025-04-07 NOTE — TELEPHONE ENCOUNTER
PT CALLED IN AND STATES FRIDAY AFTER HER BLOOD WORK SHE BELCHED OUT OF NOWHERE AND IMMEDIATELY STARTED UNCONTROLLABLY SHAKING AND THREW UP AND CONTINUED TO HAVE THE SHAKES FOR ABOUT AN HOUR OR MORE AFTER VOMITTING. SHE NOTICED THAT NIGHT SHE HAD A LARGE PAINFUL SWOLLEN LYMPH NODE ON THE LEFT SIDE OF HER THROAT NEAR HER COLLAR BONE. STATES THAT SINCE SHE BELCHED VOMITED AND ALL THE SHAKES SHE HAS FELT TIRED/FATIGUED EVEN THOUGH SHE HAS HAD ENOUGH SLEEP, STATES SHE IS SHORT OF BREATH STATING SHE FEELS LIKE SHE CAN NOT CATCH HER BREATH.      WAS UNSURE IF THIS COULD BE SOMETHING NOTICEABLE IN HER LABS UNTIL SHE SAW YOU HAD GONE OVER IT ALREADY AND IT WAS NORMAL FOR HER SO IS CONCERNED WHAT THIS COULD BE    PLEASE ADVISE

## 2025-04-07 NOTE — TELEPHONE ENCOUNTER
Andrae Avila MD  Do [id number] Clerical2 hours ago (12:34 PM)       Given her symptoms I would recommend she stick with clear liquids and light foods.  See if she can come in and see me tomorrow at 11:00 or 1115 or Wednesday.  Please let her know and arrange

## 2025-04-07 NOTE — TELEPHONE ENCOUNTER
----- Message from Andrae Avila sent at 4/6/2025 11:43 AM EDT -----  Labs are within normal limits or stable except for elevated sugar, cholesterol and triglycerides.  A1c went up from 6.9-7.5 which may be related to the winter and holidays.  Recommend continuing same medications but would improve your low sugar low-fat diet.  Follow-up with me in about 6 months.  Please let her know

## 2025-04-08 ENCOUNTER — OFFICE VISIT (OUTPATIENT)
Dept: PRIMARY CARE | Facility: CLINIC | Age: 55
End: 2025-04-08
Payer: COMMERCIAL

## 2025-04-08 VITALS
BODY MASS INDEX: 40.52 KG/M2 | RESPIRATION RATE: 18 BRPM | HEART RATE: 101 BPM | TEMPERATURE: 97.8 F | HEIGHT: 62 IN | DIASTOLIC BLOOD PRESSURE: 74 MMHG | WEIGHT: 220.2 LBS | SYSTOLIC BLOOD PRESSURE: 126 MMHG | OXYGEN SATURATION: 92 %

## 2025-04-08 DIAGNOSIS — I88.9 LYMPHADENITIS: ICD-10-CM

## 2025-04-08 DIAGNOSIS — E78.5 HYPERLIPIDEMIA, UNSPECIFIED HYPERLIPIDEMIA TYPE: ICD-10-CM

## 2025-04-08 DIAGNOSIS — M47.26 OSTEOARTHRITIS OF SPINE WITH RADICULOPATHY, LUMBAR REGION: ICD-10-CM

## 2025-04-08 DIAGNOSIS — I10 HYPERTENSION, UNSPECIFIED TYPE: ICD-10-CM

## 2025-04-08 DIAGNOSIS — E11.8 DM TYPE 2, CONTROLLED, WITH COMPLICATION (MULTI): ICD-10-CM

## 2025-04-08 DIAGNOSIS — J06.9 UPPER RESPIRATORY TRACT INFECTION, UNSPECIFIED TYPE: Primary | ICD-10-CM

## 2025-04-08 PROCEDURE — 3008F BODY MASS INDEX DOCD: CPT | Performed by: FAMILY MEDICINE

## 2025-04-08 PROCEDURE — 3074F SYST BP LT 130 MM HG: CPT | Performed by: FAMILY MEDICINE

## 2025-04-08 PROCEDURE — 99213 OFFICE O/P EST LOW 20 MIN: CPT | Performed by: FAMILY MEDICINE

## 2025-04-08 PROCEDURE — 3078F DIAST BP <80 MM HG: CPT | Performed by: FAMILY MEDICINE

## 2025-04-08 RX ORDER — DOXYCYCLINE 100 MG/1
100 CAPSULE ORAL 2 TIMES DAILY
Qty: 20 CAPSULE | Refills: 0 | Status: SHIPPED | OUTPATIENT
Start: 2025-04-08 | End: 2025-04-18

## 2025-04-08 NOTE — PROGRESS NOTES
"Subjective   Patient ID: Giulia Vaz is a 55 y.o. female who presents for Follow-up, Hypertension, and Diabetes.  HPI    Patient presents today for a follow-up on Diabetes, Hypertension, and Hyperlipidemia. Does follow a low sugar, low sodium, and low fat diet. Does check sugar and BP at home. Staying active.     Patient states her gland in her throat is swollen up and sore. Ongoing since yesterday. Hasn't tired anything.   Patient states she was taking antibiotics that she got from her dentist.  Finished those she believes yesterday    Taking current medications which were reviewed.  Problem list discussed.    Overall doing well.  Eating okay.  Staying active.    Has no other new problem /question.     ROS  Constitutional- No activity change. No appetite change.  Eyes- Denies vision changes.  Respiratory- No shortness of breath.  Cardiovascular- No palpitations. No chest pain.  GI- No nausea or vomiting. No diarrhea or constipation. Denies abdominal pain.  Musculoskeletal- Denies joint swelling.  Extremities- No edema.  Neurological- Denies headaches. Denies dizziness.  Skin- No rashes.  Psychiatric/Behavioral- Denies significant anxiety, or depressed mood.     Objective     /74   Pulse 101   Temp 36.6 °C (97.8 °F) (Temporal)   Resp 18   Ht 1.575 m (5' 2\")   Wt 99.9 kg (220 lb 3.2 oz)   SpO2 92%   BMI 40.28 kg/m²     Allergies   Allergen Reactions    Ampicillin Hives    Amoxicillin-Pot Clavulanate Rash    Cefuroxime Axetil Rash    Ciprofloxacin Rash    Sulfur Rash       Constitutional-- Well-nourished.  No distress  Head- unremarkable.  Ears- TMs clear.  Eyes- PERRL.  Conjunctiva normal.  Nose-mildly congested with some extra sinus pressure  Throat- Oropharynx is clear and moist.  Neck- Supple with no thyromegaly.  Mild anterior cervical adenopathy noted on the left.  Denies tooth pain  Pulmonary/Chest- Breath sounds normal with normal effort.  No wheezing.  Heart- Regular rate and rhythm.  No " murmur.  Abdomen- Soft and non-tender.  No masses noted.  Musculoskeletal-OA changes hands and knees noted  Extremities- No edema.   Neurological- Alert.  No noted deficits.  Skin- Warm.  No rashes.  Psychiatric/Behavioral- Mood and affect normal.  Behavior normal.     Assessment/Plan   1. Upper respiratory tract infection, unspecified type  doxycycline (Vibramycin) 100 mg capsule      2. Lymphadenitis        3. DM type 2, controlled, with complication (Multi)        4. Hypertension, unspecified type        5. Hyperlipidemia, unspecified hyperlipidemia type        6. Osteoarthritis of spine with radiculopathy, lumbar region               Long talk. Treatment options reviewed.    Reviewed most recent lab work with patient. Advised patient to remain up to date on routine maintenance and health screening.      Discussed upper respiratory tract infection. Take Doxycycline as discussed. Continue to monitor.     Discussed Diabetes Mellitus. Continue to monitor glucose levels. Educated on diabetes, low-calorie diet and exercise. Recommended eye exam once a year. Educated on diabetic foot care.    Hypertension controlled.     Osteoarthritis controlled. Educated the patient on osteoarthritis care and management. Educated on muscle strength and exercise.    Discussed importance of natural sources of nutrition.  Advised patient to consume vegetables, salads, fruits, nuts, and proteins such as fish and chicken.  Discussed portion control.      Discussed the importance of routine stretching and exercise.     Continue and take your medications as prescribed.    Health Maintenance issues discussed.    Importance of healthy diet and regular exercise regimen discussed.      Follow-up as instructed or sooner if any problems or symptoms do not resolve as expected.      Scribe Attestation  By signing my name below, Clementine TRAVIS Scribe   attest that this documentation has been prepared under the direction and in the presence of  Andrae Avila MD.

## 2025-04-09 DIAGNOSIS — E11.8 DM TYPE 2, CONTROLLED, WITH COMPLICATION (MULTI): ICD-10-CM

## 2025-04-09 RX ORDER — BLOOD-GLUCOSE SENSOR
EACH MISCELLANEOUS
Qty: 2 EACH | Refills: 2 | Status: SHIPPED | OUTPATIENT
Start: 2025-04-09

## 2025-04-14 ENCOUNTER — APPOINTMENT (OUTPATIENT)
Dept: PRIMARY CARE | Facility: CLINIC | Age: 55
End: 2025-04-14
Payer: COMMERCIAL

## 2025-05-06 ENCOUNTER — TELEPHONE (OUTPATIENT)
Dept: PRIMARY CARE | Facility: CLINIC | Age: 55
End: 2025-05-06
Payer: COMMERCIAL

## 2025-05-06 DIAGNOSIS — M47.26 OSTEOARTHRITIS OF SPINE WITH RADICULOPATHY, LUMBAR REGION: Primary | ICD-10-CM

## 2025-05-06 RX ORDER — DICLOFENAC SODIUM 75 MG/1
75 TABLET, DELAYED RELEASE ORAL 2 TIMES DAILY PRN
Qty: 60 TABLET | Refills: 2 | Status: SHIPPED | OUTPATIENT
Start: 2025-05-06 | End: 2026-05-06

## 2025-05-06 NOTE — TELEPHONE ENCOUNTER
Patient is calling because last Thursday she was doing yard work and lifting plantar pots and her back went out. She states it is swollen and she can barely move. She already has a lot of issues with her back. She wanted to know if you could either give her a RX for anti inflammatories or see her in a virtual visit for this issues. She doesn't think she can drive to come in for an appointment. She can't take steroids because it makes her sugars go up.    Please advise  Thanks    Patient's # [phone]    DOL visit 4/8/25 LYNNE

## 2025-05-06 NOTE — TELEPHONE ENCOUNTER
Please let her know I sent in a very strong anti-inflammatory to take as directed. Okay to mix with Tylenol. Please let her know       Called patient and she is aware

## 2025-06-23 DIAGNOSIS — E11.8 DM TYPE 2, CONTROLLED, WITH COMPLICATION (MULTI): ICD-10-CM

## 2025-06-23 DIAGNOSIS — E78.5 HYPERLIPIDEMIA, UNSPECIFIED HYPERLIPIDEMIA TYPE: ICD-10-CM

## 2025-06-23 DIAGNOSIS — I10 HYPERTENSION, UNSPECIFIED TYPE: ICD-10-CM

## 2025-06-23 DIAGNOSIS — R73.09 ELEVATED GLUCOSE: ICD-10-CM

## 2025-06-23 RX ORDER — METFORMIN HYDROCHLORIDE 500 MG/1
500 TABLET ORAL
Qty: 90 TABLET | Refills: 0 | Status: SHIPPED | OUTPATIENT
Start: 2025-06-23

## 2025-06-23 RX ORDER — IRBESARTAN AND HYDROCHLOROTHIAZIDE 300; 12.5 MG/1; MG/1
1 TABLET, FILM COATED ORAL DAILY
Qty: 90 TABLET | Refills: 0 | Status: SHIPPED | OUTPATIENT
Start: 2025-06-23

## 2025-06-23 RX ORDER — GLIMEPIRIDE 2 MG/1
2 TABLET ORAL
Qty: 90 TABLET | Refills: 0 | Status: SHIPPED | OUTPATIENT
Start: 2025-06-23

## 2025-06-23 RX ORDER — ROSUVASTATIN CALCIUM 10 MG/1
10 TABLET, COATED ORAL DAILY
Qty: 90 TABLET | Refills: 0 | Status: SHIPPED | OUTPATIENT
Start: 2025-06-23

## 2025-07-15 DIAGNOSIS — E11.8 DM TYPE 2, CONTROLLED, WITH COMPLICATION (MULTI): ICD-10-CM

## 2025-07-15 RX ORDER — BLOOD-GLUCOSE SENSOR
EACH MISCELLANEOUS
Qty: 2 EACH | Refills: 2 | Status: SHIPPED | OUTPATIENT
Start: 2025-07-15

## 2025-07-15 NOTE — TELEPHONE ENCOUNTER
Rx Refill Request     Name: Giulia Vaz  :  1970   Medication Name:  blood-glucose sensor (FreeStyle Rupesh 3 Plus Sensor) device   Specific Pharmacy location:  [id number] - [place], [place]   Date of last appointment:  2025   Date of next appointment:  2025   Best number to reach patient:  [phone]

## 2025-08-05 ENCOUNTER — PATIENT OUTREACH (OUTPATIENT)
Dept: CARE COORDINATION | Facility: CLINIC | Age: 55
End: 2025-08-05
Payer: COMMERCIAL

## 2025-08-05 DIAGNOSIS — Z12.31 ENCOUNTER FOR SCREENING MAMMOGRAM FOR BREAST CANCER: ICD-10-CM

## 2025-08-06 ENCOUNTER — APPOINTMENT (OUTPATIENT)
Dept: PRIMARY CARE | Facility: CLINIC | Age: 55
End: 2025-08-06
Payer: COMMERCIAL

## 2025-08-06 VITALS
SYSTOLIC BLOOD PRESSURE: 136 MMHG | HEART RATE: 91 BPM | WEIGHT: 211 LBS | TEMPERATURE: 96.8 F | HEIGHT: 62 IN | OXYGEN SATURATION: 96 % | DIASTOLIC BLOOD PRESSURE: 80 MMHG | BODY MASS INDEX: 38.83 KG/M2

## 2025-08-06 DIAGNOSIS — E78.5 HYPERLIPIDEMIA, UNSPECIFIED HYPERLIPIDEMIA TYPE: ICD-10-CM

## 2025-08-06 DIAGNOSIS — K21.9 GERD WITHOUT ESOPHAGITIS: ICD-10-CM

## 2025-08-06 DIAGNOSIS — R73.09 ELEVATED GLUCOSE: ICD-10-CM

## 2025-08-06 DIAGNOSIS — G43.809 OTHER MIGRAINE WITHOUT STATUS MIGRAINOSUS, NOT INTRACTABLE: ICD-10-CM

## 2025-08-06 DIAGNOSIS — J01.90 ACUTE NON-RECURRENT SINUSITIS, UNSPECIFIED LOCATION: Primary | ICD-10-CM

## 2025-08-06 DIAGNOSIS — R06.2 WHEEZING: ICD-10-CM

## 2025-08-06 DIAGNOSIS — I10 HYPERTENSION, UNSPECIFIED TYPE: ICD-10-CM

## 2025-08-06 DIAGNOSIS — E11.8 DM TYPE 2, CONTROLLED, WITH COMPLICATION (MULTI): ICD-10-CM

## 2025-08-06 DIAGNOSIS — J40 BRONCHITIS: ICD-10-CM

## 2025-08-06 DIAGNOSIS — J32.9 SINUSITIS, UNSPECIFIED CHRONICITY, UNSPECIFIED LOCATION: ICD-10-CM

## 2025-08-06 PROCEDURE — 3079F DIAST BP 80-89 MM HG: CPT | Performed by: FAMILY MEDICINE

## 2025-08-06 PROCEDURE — 99213 OFFICE O/P EST LOW 20 MIN: CPT | Performed by: FAMILY MEDICINE

## 2025-08-06 PROCEDURE — 3075F SYST BP GE 130 - 139MM HG: CPT | Performed by: FAMILY MEDICINE

## 2025-08-06 PROCEDURE — 3008F BODY MASS INDEX DOCD: CPT | Performed by: FAMILY MEDICINE

## 2025-08-06 RX ORDER — ROSUVASTATIN CALCIUM 10 MG/1
10 TABLET, COATED ORAL DAILY
Qty: 90 TABLET | Refills: 0 | Status: SHIPPED | OUTPATIENT
Start: 2025-08-06

## 2025-08-06 RX ORDER — BUTALBITAL, ACETAMINOPHEN AND CAFFEINE 50; 325; 40 MG/1; MG/1; MG/1
1 CAPSULE ORAL EVERY 6 HOURS PRN
Qty: 40 CAPSULE | Refills: 1 | Status: SHIPPED | OUTPATIENT
Start: 2025-08-06

## 2025-08-06 RX ORDER — PANTOPRAZOLE SODIUM 40 MG/1
40 TABLET, DELAYED RELEASE ORAL DAILY
Qty: 30 TABLET | Refills: 4 | Status: SHIPPED | OUTPATIENT
Start: 2025-08-06

## 2025-08-06 RX ORDER — GLIMEPIRIDE 2 MG/1
2 TABLET ORAL
Qty: 90 TABLET | Refills: 0 | Status: SHIPPED | OUTPATIENT
Start: 2025-08-06

## 2025-08-06 RX ORDER — AZITHROMYCIN 250 MG/1
TABLET, FILM COATED ORAL
Qty: 6 TABLET | Refills: 0 | Status: SHIPPED | OUTPATIENT
Start: 2025-08-06 | End: 2025-08-11

## 2025-08-06 RX ORDER — METFORMIN HYDROCHLORIDE 500 MG/1
500 TABLET ORAL
Qty: 90 TABLET | Refills: 0 | Status: SHIPPED | OUTPATIENT
Start: 2025-08-06

## 2025-08-06 RX ORDER — ALBUTEROL SULFATE 90 UG/1
2 INHALANT RESPIRATORY (INHALATION) EVERY 4 HOURS PRN
Qty: 18 G | Refills: 5 | Status: SHIPPED | OUTPATIENT
Start: 2025-08-06

## 2025-08-06 RX ORDER — PREDNISONE 10 MG/1
TABLET ORAL
Qty: 9 TABLET | Refills: 0 | Status: SHIPPED | OUTPATIENT
Start: 2025-08-06

## 2025-08-11 ENCOUNTER — TELEPHONE (OUTPATIENT)
Dept: PRIMARY CARE | Facility: CLINIC | Age: 55
End: 2025-08-11
Payer: COMMERCIAL

## 2025-08-11 DIAGNOSIS — U07.1 COVID: Primary | ICD-10-CM

## 2025-08-11 RX ORDER — NIRMATRELVIR AND RITONAVIR 300-100 MG
3 KIT ORAL 2 TIMES DAILY
Qty: 1 DOSE PACK | Refills: 0 | Status: SHIPPED | OUTPATIENT
Start: 2025-08-11

## 2025-08-20 ENCOUNTER — HOSPITAL ENCOUNTER (OUTPATIENT)
Dept: RADIOLOGY | Facility: CLINIC | Age: 55
Discharge: HOME | End: 2025-08-20
Payer: COMMERCIAL

## 2025-08-20 ENCOUNTER — OFFICE VISIT (OUTPATIENT)
Dept: PRIMARY CARE | Facility: CLINIC | Age: 55
End: 2025-08-20
Payer: COMMERCIAL

## 2025-08-20 DIAGNOSIS — J40 BRONCHITIS: ICD-10-CM

## 2025-08-20 PROCEDURE — 71046 X-RAY EXAM CHEST 2 VIEWS: CPT

## 2025-08-20 PROCEDURE — 71046 X-RAY EXAM CHEST 2 VIEWS: CPT | Performed by: RADIOLOGY

## 2025-08-20 ASSESSMENT — ENCOUNTER SYMPTOMS: DEPRESSION: 0

## 2025-08-21 ENCOUNTER — RESULTS FOLLOW-UP (OUTPATIENT)
Dept: PRIMARY CARE | Facility: CLINIC | Age: 55
End: 2025-08-21
Payer: COMMERCIAL

## 2025-08-21 LAB
ANION GAP SERPL CALCULATED.4IONS-SCNC: 11 MMOL/L (CALC) (ref 7–17)
BUN SERPL-MCNC: 6 MG/DL (ref 7–25)
BUN/CREAT SERPL: 12 (CALC) (ref 6–22)
CALCIUM SERPL-MCNC: 8.9 MG/DL (ref 8.6–10.4)
CHLORIDE SERPL-SCNC: 99 MMOL/L (ref 98–110)
CO2 SERPL-SCNC: 30 MMOL/L (ref 20–32)
CREAT SERPL-MCNC: 0.5 MG/DL (ref 0.5–1.03)
EGFRCR SERPLBLD CKD-EPI 2021: 109 ML/MIN/1.73M2
EST. AVERAGE GLUCOSE BLD GHB EST-MCNC: 166 MG/DL
EST. AVERAGE GLUCOSE BLD GHB EST-SCNC: 9.2 MMOL/L
GLUCOSE SERPL-MCNC: 142 MG/DL (ref 65–99)
HBA1C MFR BLD: 7.4 %
POTASSIUM SERPL-SCNC: 4.3 MMOL/L (ref 3.5–5.3)
SODIUM SERPL-SCNC: 140 MMOL/L (ref 135–146)

## 2025-08-22 ENCOUNTER — TELEPHONE (OUTPATIENT)
Dept: PRIMARY CARE | Facility: CLINIC | Age: 55
End: 2025-08-22
Payer: COMMERCIAL

## 2026-02-04 ENCOUNTER — APPOINTMENT (OUTPATIENT)
Dept: PRIMARY CARE | Facility: CLINIC | Age: 56
End: 2026-02-04
Payer: COMMERCIAL